# Patient Record
Sex: MALE | Race: WHITE | NOT HISPANIC OR LATINO | Employment: OTHER | URBAN - METROPOLITAN AREA
[De-identification: names, ages, dates, MRNs, and addresses within clinical notes are randomized per-mention and may not be internally consistent; named-entity substitution may affect disease eponyms.]

---

## 2018-05-18 ENCOUNTER — OFFICE VISIT (OUTPATIENT)
Dept: MEDICAL GROUP | Facility: MEDICAL CENTER | Age: 73
End: 2018-05-18
Payer: MEDICARE

## 2018-05-18 VITALS
RESPIRATION RATE: 16 BRPM | SYSTOLIC BLOOD PRESSURE: 130 MMHG | HEART RATE: 85 BPM | BODY MASS INDEX: 32.14 KG/M2 | TEMPERATURE: 98.8 F | OXYGEN SATURATION: 98 % | HEIGHT: 69 IN | WEIGHT: 217 LBS | DIASTOLIC BLOOD PRESSURE: 72 MMHG

## 2018-05-18 DIAGNOSIS — E03.4 HYPOTHYROIDISM DUE TO ACQUIRED ATROPHY OF THYROID: ICD-10-CM

## 2018-05-18 DIAGNOSIS — E78.5 DYSLIPIDEMIA: ICD-10-CM

## 2018-05-18 DIAGNOSIS — I10 ESSENTIAL HYPERTENSION: ICD-10-CM

## 2018-05-18 DIAGNOSIS — Z86.73 H/O TIA (TRANSIENT ISCHEMIC ATTACK) AND STROKE: ICD-10-CM

## 2018-05-18 DIAGNOSIS — Z86.79 H/O HEART FAILURE: ICD-10-CM

## 2018-05-18 DIAGNOSIS — I73.00 RAYNAUD'S PHENOMENON WITHOUT GANGRENE: ICD-10-CM

## 2018-05-18 PROCEDURE — 99204 OFFICE O/P NEW MOD 45 MIN: CPT | Performed by: NURSE PRACTITIONER

## 2018-05-18 RX ORDER — ATORVASTATIN CALCIUM 80 MG/1
80 TABLET, FILM COATED ORAL NIGHTLY
COMMUNITY
End: 2018-05-18

## 2018-05-18 RX ORDER — ATORVASTATIN CALCIUM 40 MG/1
40 TABLET, FILM COATED ORAL DAILY
Qty: 30 TAB | Refills: 11
Start: 2018-05-18 | End: 2018-07-18 | Stop reason: SDUPTHER

## 2018-05-18 RX ORDER — AMLODIPINE BESYLATE AND ATORVASTATIN CALCIUM 2.5; 1 MG/1; MG/1
1 TABLET, FILM COATED ORAL NIGHTLY
COMMUNITY
End: 2018-05-18

## 2018-05-18 RX ORDER — AMLODIPINE BESYLATE 2.5 MG/1
2.5 TABLET ORAL DAILY
Qty: 30 TAB | Refills: 11
Start: 2018-05-18 | End: 2018-07-18 | Stop reason: SDUPTHER

## 2018-05-18 RX ORDER — LEVOTHYROXINE SODIUM 0.05 MG/1
50 TABLET ORAL
COMMUNITY
End: 2018-07-18 | Stop reason: SDUPTHER

## 2018-05-18 RX ORDER — LISINOPRIL 10 MG/1
10 TABLET ORAL DAILY
COMMUNITY
End: 2018-07-18 | Stop reason: SDUPTHER

## 2018-05-18 RX ORDER — LIDOCAINE HYDROCHLORIDE 10 MG/ML
INJECTION, SOLUTION INFILTRATION; PERINEURAL ONCE
COMMUNITY
End: 2018-05-18

## 2018-05-18 RX ORDER — BETAMETHASONE DIPROPIONATE 0.05 %
OINTMENT (GRAM) TOPICAL 2 TIMES DAILY
COMMUNITY
End: 2018-05-18

## 2018-05-18 ASSESSMENT — PATIENT HEALTH QUESTIONNAIRE - PHQ9: CLINICAL INTERPRETATION OF PHQ2 SCORE: 0

## 2018-05-18 NOTE — PROGRESS NOTES
Subjective:      Luke Davis is a 72 y.o. male who presents with Establish Care and Medication Refill        CC: Patient is here today to establish care with history of Reynauds, dyslipidemia and hypothyroidism. He recently moved here from the [place] area.    HPI Luke Davis      1. H/O heart failure  Patient apparently had an episode of heart failure 1995 and he states was related to a period in time where he was having issues with his reynauds and has had no problems since then. He states he does not see a cardiologist and has not had a recent echocardiogram. He is not on a diuretic and reports no edema.    2. H/O TIA (transient ischemic attack) and stroke  This apparently happened in 2011 and he has had no problems since. He is on Lipitor.    3. Dyslipidemia  Patient currently on half of a 80 mg Lipitor tablet and would prefer to go to a full 40 mg so he does not have to cut his tablets in half.    4. Raynaud's phenomenon without gangrene  Patient controls this mostly by keeping his hands warm when it is cold outside as well as taking his low-dose amlodipine.    5. Hypothyroidism due to acquired atrophy of thyroid  Patient's most recent TSH from 2 months ago was at 6.0. He states he takes his levothyroxine 50 daily.    6. Essential hypertension  Blood pressure appears well controlled on lisinopril and Norvasc.  Current Outpatient Prescriptions   Medication Sig Dispense Refill   • levothyroxine (SYNTHROID) 50 MCG Tab Take 50 mcg by mouth Every morning on an empty stomach.     • lisinopril (PRINIVIL) 10 MG Tab Take 10 mg by mouth every day.     • atorvastatin (LIPITOR) 40 MG Tab Take 1 Tab by mouth every day. 30 Tab 11   • amLODIPine (NORVASC) 2.5 MG Tab Take 1 Tab by mouth every day. 30 Tab 11     No current facility-administered medications for this visit.      Social History   Substance Use Topics   • Smoking status: Never Smoker   • Smokeless tobacco: Never Used   • Alcohol use Yes     Family History   Problem  "Relation Age of Onset   • No Known Problems Mother    • No Known Problems Father    History reviewed. No pertinent past medical history.    Review of Systems   All other systems reviewed and are negative.         Objective:     /72   Pulse 85   Temp 37.1 °C (98.8 °F)   Resp 16   Ht 1.753 m (5' 9\")   Wt 98.4 kg (217 lb)   SpO2 98%   BMI 32.05 kg/m²      Physical Exam   Constitutional: He is oriented to person, place, and time. He appears well-developed and well-nourished. No distress.   HENT:   Head: Normocephalic and atraumatic.   Right Ear: External ear normal.   Left Ear: External ear normal.   Nose: Nose normal.   Mouth/Throat: Oropharynx is clear and moist.   Eyes: Conjunctivae are normal. Right eye exhibits no discharge. Left eye exhibits no discharge.   Neck: Normal range of motion. Neck supple. No tracheal deviation present. No thyromegaly present.   Cardiovascular: Normal rate, regular rhythm and normal heart sounds.    No murmur heard.  Pulmonary/Chest: Effort normal and breath sounds normal. No respiratory distress. He has no wheezes. He has no rales.   Lymphadenopathy:     He has no cervical adenopathy.   Neurological: He is alert and oriented to person, place, and time. Coordination normal.   Skin: Skin is warm and dry. No rash noted. He is not diaphoretic. No erythema.   Psychiatric: He has a normal mood and affect. His behavior is normal. Judgment and thought content normal.   Nursing note and vitals reviewed.              Assessment/Plan:     1. H/O heart failure  Patient reports this was a one-time event but it does not appear he has had a recent echocardiogram and would like to check this and refer him to cardiology if necessary. He is not on a diuretic but reports no edema. He is taking a statin and ACE inhibitor.  - ECHOCARDIOGRAM COMP W/O CONT; Future    2. H/O TIA (transient ischemic attack) and stroke  Patient reports no episodes since 2011.    3. Dyslipidemia  I will switch " patient to the full tablet of the Lipitor 40 mg so he does not have to cut tablets in half.  - atorvastatin (LIPITOR) 40 MG Tab; Take 1 Tab by mouth every day.  Dispense: 30 Tab; Refill: 11    4. Raynaud's phenomenon without gangrene  Patient states his symptoms are controlled with his low-dose amlodipine and keeping his hands away from cold objects.  - amLODIPine (NORVASC) 2.5 MG Tab; Take 1 Tab by mouth every day.  Dispense: 30 Tab; Refill: 11    5. Hypothyroidism due to acquired atrophy of thyroid  TSH recently done and within acceptable range for his age.    6. Essential hypertension  Blood pressure currently well controlled.

## 2018-05-18 NOTE — LETTER
[facility]  ZORA ValdovinosPYEMI  [address]  [place] [address]  Fax: [phone]   Authorization for Release/Disclosure of   Protected Health Information   Name: LUKE DAVIS : 1945 SSN: [id number]   Address: [address] [place] [address] [address] [place] [address] Phone:    [phone] (home)    I authorize the entity listed below to release/disclose the PHI below to:   [facility]/MARY Valdovinos and MARY Valdovinos   Provider or Entity Name:     Address   City, State, Zip   Phone:      Fax:     Reason for request: continuity of care   Information to be released:    [  ] LAST COLONOSCOPY,  including any PATH REPORT and follow-up  [  ] LAST FIT/COLOGUARD RESULT [  ] LAST DEXA  [  ] LAST MAMMOGRAM  [  ] LAST PAP  [  ] LAST LABS [  ] RETINA EXAM REPORT  [  ] IMMUNIZATION RECORDS  [  ] Release all info      [  ] Check here and initial the line next to each item to release ALL health information INCLUDING  _____ Care and treatment for drug and / or alcohol abuse  _____ HIV testing, infection status, or AIDS  _____ Genetic Testing    DATES OF SERVICE OR TIME PERIOD TO BE DISCLOSED: _____________  I understand and acknowledge that:  * This Authorization may be revoked at any time by you in writing, except if your health information has already been used or disclosed.  * Your health information that will be used or disclosed as a result of you signing this authorization could be re-disclosed by the recipient. If this occurs, your re-disclosed health information may no longer be protected by State or Federal laws.  * You may refuse to sign this Authorization. Your refusal will not affect your ability to obtain treatment.  * This Authorization becomes effective upon signing and will  on (date) __________.      If no date is indicated, this Authorization will  one (1) year from the signature date.    Name: Luke Davis    Signature:   Date:     2018       PLEASE FAX REQUESTED  RECORDS BACK TO: [phone]

## 2018-07-09 ENCOUNTER — APPOINTMENT (OUTPATIENT)
Dept: MEDICAL GROUP | Facility: MEDICAL CENTER | Age: 73
End: 2018-07-09
Payer: MEDICARE

## 2018-07-09 ENCOUNTER — HOSPITAL ENCOUNTER (OUTPATIENT)
Dept: CARDIOLOGY | Facility: MEDICAL CENTER | Age: 73
End: 2018-07-09
Attending: NURSE PRACTITIONER
Payer: MEDICARE

## 2018-07-09 DIAGNOSIS — Z86.79 H/O HEART FAILURE: ICD-10-CM

## 2018-07-09 DIAGNOSIS — Z23 NEED FOR TDAP VACCINATION: ICD-10-CM

## 2018-07-09 LAB
LV EJECT FRACT  99904: 50
LV EJECT FRACT MOD 2C 99903: 51.65
LV EJECT FRACT MOD 4C 99902: 51.99
LV EJECT FRACT MOD BP 99901: 54.02

## 2018-07-09 PROCEDURE — 93306 TTE W/DOPPLER COMPLETE: CPT | Mod: 26 | Performed by: INTERNAL MEDICINE

## 2018-07-09 PROCEDURE — 93306 TTE W/DOPPLER COMPLETE: CPT

## 2018-07-12 ENCOUNTER — APPOINTMENT (OUTPATIENT)
Dept: MEDICAL GROUP | Facility: MEDICAL CENTER | Age: 73
End: 2018-07-12
Payer: MEDICARE

## 2018-07-18 ENCOUNTER — OFFICE VISIT (OUTPATIENT)
Dept: MEDICAL GROUP | Facility: MEDICAL CENTER | Age: 73
End: 2018-07-18
Payer: MEDICARE

## 2018-07-18 VITALS
SYSTOLIC BLOOD PRESSURE: 130 MMHG | OXYGEN SATURATION: 97 % | RESPIRATION RATE: 16 BRPM | DIASTOLIC BLOOD PRESSURE: 68 MMHG | HEIGHT: 69 IN | HEART RATE: 82 BPM | WEIGHT: 212 LBS | BODY MASS INDEX: 31.4 KG/M2 | TEMPERATURE: 97.7 F

## 2018-07-18 DIAGNOSIS — I73.00 RAYNAUD'S PHENOMENON WITHOUT GANGRENE: ICD-10-CM

## 2018-07-18 DIAGNOSIS — R73.01 IMPAIRED FASTING GLUCOSE: ICD-10-CM

## 2018-07-18 DIAGNOSIS — Z86.79 H/O HEART FAILURE: ICD-10-CM

## 2018-07-18 DIAGNOSIS — E78.5 DYSLIPIDEMIA: ICD-10-CM

## 2018-07-18 DIAGNOSIS — Z86.73 H/O TIA (TRANSIENT ISCHEMIC ATTACK) AND STROKE: ICD-10-CM

## 2018-07-18 PROCEDURE — 99214 OFFICE O/P EST MOD 30 MIN: CPT | Performed by: NURSE PRACTITIONER

## 2018-07-18 RX ORDER — LISINOPRIL 10 MG/1
10 TABLET ORAL DAILY
Qty: 90 TAB | Refills: 2 | Status: SHIPPED | OUTPATIENT
Start: 2018-07-18 | End: 2019-04-17 | Stop reason: SDUPTHER

## 2018-07-18 RX ORDER — ASPIRIN 81 MG/1
81 TABLET, CHEWABLE ORAL DAILY
Qty: 100 TAB | COMMUNITY
Start: 2018-07-18

## 2018-07-18 RX ORDER — ATORVASTATIN CALCIUM 40 MG/1
40 TABLET, FILM COATED ORAL DAILY
Qty: 90 TAB | Refills: 2 | Status: SHIPPED | OUTPATIENT
Start: 2018-07-18 | End: 2018-07-18

## 2018-07-18 RX ORDER — ATORVASTATIN CALCIUM 80 MG/1
80 TABLET, FILM COATED ORAL EVERY EVENING
Qty: 90 TAB | Refills: 3 | Status: SHIPPED | OUTPATIENT
Start: 2018-07-18 | End: 2019-04-17 | Stop reason: SDUPTHER

## 2018-07-18 RX ORDER — LEVOTHYROXINE SODIUM 0.05 MG/1
50 TABLET ORAL
Qty: 90 TAB | Refills: 2 | Status: SHIPPED | OUTPATIENT
Start: 2018-07-18 | End: 2019-04-17 | Stop reason: SDUPTHER

## 2018-07-18 RX ORDER — AMLODIPINE BESYLATE 2.5 MG/1
2.5 TABLET ORAL DAILY
Qty: 90 TAB | Refills: 2 | Status: SHIPPED | OUTPATIENT
Start: 2018-07-18 | End: 2019-04-15

## 2018-07-18 NOTE — PROGRESS NOTES
Subjective:      Luke Davis is a 72 y.o. male who presents with Other (right tumb issues )        CC: Patient is here today for follow-up on echocardiogram as well as nail concerns.    HPI Luke Davis    1. H/O heart failure  Patient was seen for his first visit in May of this year after moving from the [place] area. He reports history of heart failure in 1995 and a period of time when he was having issues with his Raynaud's but does not feel he has had knee problems since then. I had him go for an echocardiogram since he reported he had not had one recently and the results showed low normal ejection fraction of 50% but otherwise normal. He denies lower extremity edema, shortness of breath, easy fatigability or inability to lay flat at night.    2. H/O TIA (transient ischemic attack) and stroke  Patient has history of TIA from 2011 and does take a baby aspirin daily and is on antihypertensive medicine and statin.    3. Dyslipidemia  Patient currently on Lipitor 40 mg a day and reports no side effects. He has many risk factors including history of TIA and stroke and heart failure. His most recent lab work from his March 2018 notes show LDL at 122 despite medication.    4. Raynaud's phenomenon without gangrene  Patient continues to use warm gloves when it is cold outside. He noticed a indentation in his right large thumbnail which initially caused some discomfort but now is growing out and causing no pain. He has not seen redness, swelling or discharge. It has not affected any other nail.    5. Impaired fasting glucose  Previous lab work from [place] showed hemoglobin A1c of 5.9. He is not on any medication for diabetes.      Echocardiography Laboratory    CONCLUSIONS  Left ventricular systolic function is low normal.  No significant valve abnormalities.   No prior study is available for comparison.   Social History   Substance Use Topics   • Smoking status: Never Smoker   • Smokeless tobacco: Never Used  "  • Alcohol use Yes     Current Outpatient Prescriptions   Medication Sig Dispense Refill   • aspirin (ASA) 81 MG Chew Tab chewable tablet Take 1 Tab by mouth every day. 100 Tab    • levothyroxine (SYNTHROID) 50 MCG Tab Take 1 Tab by mouth Every morning on an empty stomach. 90 Tab 2   • lisinopril (PRINIVIL) 10 MG Tab Take 1 Tab by mouth every day. 90 Tab 2   • amLODIPine (NORVASC) 2.5 MG Tab Take 1 Tab by mouth every day. 90 Tab 2   • atorvastatin (LIPITOR) 80 MG tablet Take 1 Tab by mouth every evening. 90 Tab 3     No current facility-administered medications for this visit.      Family History   Problem Relation Age of Onset   • No Known Problems Mother    • No Known Problems Father    History reviewed. No pertinent past medical history.    Review of Systems   All other systems reviewed and are negative.         Objective:     /68   Pulse 82   Temp 36.5 °C (97.7 °F)   Resp 16   Ht 1.753 m (5' 9\")   Wt 96.2 kg (212 lb)   SpO2 97%   BMI 31.31 kg/m²      Physical Exam   Constitutional: He is oriented to person, place, and time. He appears well-developed and well-nourished. No distress.   HENT:   Head: Normocephalic and atraumatic.   Right Ear: External ear normal.   Left Ear: External ear normal.   Nose: Nose normal.   Mouth/Throat: Oropharynx is clear and moist.   Eyes: Conjunctivae are normal. Right eye exhibits no discharge. Left eye exhibits no discharge.   Neck: Normal range of motion. Neck supple. No tracheal deviation present. No thyromegaly present.   Cardiovascular: Normal rate, regular rhythm and normal heart sounds.    No murmur heard.  Pulmonary/Chest: Effort normal and breath sounds normal. No respiratory distress. He has no wheezes. He has no rales.   Lymphadenopathy:     He has no cervical adenopathy.   Neurological: He is alert and oriented to person, place, and time. Coordination normal.   Skin: Skin is warm and dry. No rash noted. He is not diaphoretic. No erythema.   Right large " thumbnail slightly indented but no color changes, swelling, redness or tenderness.   Psychiatric: He has a normal mood and affect. His behavior is normal. Judgment and thought content normal.   Nursing note and vitals reviewed.              Assessment/Plan:     1. H/O heart failure  Patient has history of heart failure although he has no current heart failure symptoms. He states he has not seen a cardiologist in a while and has no cardiologist established locally. His echocardiogram showed low normal ejection fraction at 50% but valves were normal. I will get him into our local cardiology to have one established in the area.  - REFERRAL TO CARDIOLOGY  - aspirin (ASA) 81 MG Chew Tab chewable tablet; Take 1 Tab by mouth every day.  Dispense: 100 Tab  - atorvastatin (LIPITOR) 80 MG tablet; Take 1 Tab by mouth every evening.  Dispense: 90 Tab; Refill: 3    2. H/O TIA (transient ischemic attack) and stroke  Patient will continue with his baby aspirin and antihypertensive agents and I will increase his Lipitor dosage.  - REFERRAL TO CARDIOLOGY  - aspirin (ASA) 81 MG Chew Tab chewable tablet; Take 1 Tab by mouth every day.  Dispense: 100 Tab    3. Dyslipidemia  It appears the patient's last LDL was 122 and with his risk factors I would like to see this closer to 70 or less and therefore will increase his Lipitor to 80 mg. I told him to report to me any myalgias or other symptoms with the higher dosage immediately.  - atorvastatin (LIPITOR) 80 MG tablet; Take 1 Tab by mouth every evening.  Dispense: 90 Tab; Refill: 3    4. Raynaud's phenomenon without gangrene    - amLODIPine (NORVASC) 2.5 MG Tab; Take 1 Tab by mouth every day.  Dispense: 90 Tab; Refill: 2    5. Impaired fasting glucose  Patient reminded to watch his carbohydrates and lose weight to prevent becoming a type II diabetic.

## 2018-08-02 ENCOUNTER — OFFICE VISIT (OUTPATIENT)
Dept: CARDIOLOGY | Facility: MEDICAL CENTER | Age: 73
End: 2018-08-02
Payer: MEDICARE

## 2018-08-02 VITALS
DIASTOLIC BLOOD PRESSURE: 60 MMHG | SYSTOLIC BLOOD PRESSURE: 116 MMHG | WEIGHT: 210 LBS | HEART RATE: 94 BPM | HEIGHT: 69 IN | BODY MASS INDEX: 31.1 KG/M2 | OXYGEN SATURATION: 94 %

## 2018-08-02 DIAGNOSIS — Z79.899 HIGH RISK MEDICATION USE: ICD-10-CM

## 2018-08-02 DIAGNOSIS — E78.5 DYSLIPIDEMIA: ICD-10-CM

## 2018-08-02 DIAGNOSIS — R06.09 DYSPNEA ON EXERTION: ICD-10-CM

## 2018-08-02 DIAGNOSIS — I10 HTN (HYPERTENSION), MALIGNANT: ICD-10-CM

## 2018-08-02 PROCEDURE — 94618 PULMONARY STRESS TESTING: CPT | Performed by: INTERNAL MEDICINE

## 2018-08-02 PROCEDURE — 99204 OFFICE O/P NEW MOD 45 MIN: CPT | Mod: 25 | Performed by: INTERNAL MEDICINE

## 2018-08-02 ASSESSMENT — MINNESOTA LIVING WITH HEART FAILURE QUESTIONNAIRE (MLHF)
DIFFICULTY WITH SEXUAL ACTIVITIES: 0
DIFFICULTY WORKING TO EARN A LIVING: 0
LOSS OF SELF CONTROL IN YOUR LIFE: 0
MAKING YOU WORRY: 0
DIFFICULTY WITH RECREATIONAL PASTIMES, SPORTS, HOBBIES: 0
HAVING TO SIT OR LIE DOWN DURING THE DAY: 0
EATING LESS FOODS YOU LIKE: 0
COSTING YOU MONEY FOR MEDICAL CARE: 0
MAKING YOU SHORT OF BREATH: 0
DIFFICULTY TO CONCENTRATE OR REMEMBERING THINGS: 0
TOTAL_SCORE: 0
TIRED, FATIGUED OR LOW ON ENERGY: 0
SWELLING IN ANKLES OR LEGS: 0
DIFFICULTY GOING AWAY FROM HOME: 0
WORKING AROUND THE HOUSE OR YARD DIFFICULT: 0
DIFFICULTY SOCIALIZING WITH FAMILY OR FRIENDS: 0
MAKING YOU STAY IN A HOSPITAL: 0
FEELING LIKE A BURDEN TO FAMILY AND FRIENDS: 0
WALKING ABOUT OR CLIMBING STAIRS DIFFICULT: 0
MAKING YOU FEEL DEPRESSED: 0
DIFFICULTY SLEEPING WELL AT NIGHT: 0
GIVING YOU SIDE EFFECTS FROM TREATMENTS: 0

## 2018-08-02 ASSESSMENT — ENCOUNTER SYMPTOMS
HALLUCINATIONS: 0
ABDOMINAL PAIN: 0
BRUISES/BLEEDS EASILY: 0
BLURRED VISION: 0
COUGH: 0
SENSORY CHANGE: 0
FEVER: 0
PALPITATIONS: 0
DOUBLE VISION: 0
CHILLS: 0
NAUSEA: 0
DIZZINESS: 0
BLOOD IN STOOL: 0
WEIGHT LOSS: 0
HEADACHES: 0
ORTHOPNEA: 0
FALLS: 0
DEPRESSION: 0
MYALGIAS: 0
SHORTNESS OF BREATH: 0
PND: 0
CLAUDICATION: 0
SPEECH CHANGE: 0
VOMITING: 0
EYE DISCHARGE: 0
LOSS OF CONSCIOUSNESS: 0
EYE PAIN: 0

## 2018-08-02 NOTE — PROGRESS NOTES
Chief Complaint   Patient presents with   • CHF (Chronic)   • HTN (Controlled)       Subjective:   Luke Davis is a 72 y.o. male who presents today for further evaluation of prior history of heart failure. Per patient's report, the diagnosis was never confirmed. At that time, he had severe vasculitis problem with Raynaud's phenomenon. He was was on a lot of medications. His heart function might had been ineffective negatively.    Since his vasculitis was successfully treated, patient has been feeling much better. No shortness of breath, no chest pain.    Patient was able to complete 415 m during his 6 minute walk test. his O2 saturation at baseline was 90% and at the end of the test, the O2 saturation was 94%. he reported 2 level of dyspnea on Janel scale.    I have independently reviewed echocardiogram's actual images with patient in clinic which shows normal left ventricular systolic function. No wall motion abnormality. No evidence of pulmonary hypertension. No significant valvular disease.    No family history of sudden cardiac death.  History reviewed. No pertinent past medical history.  History reviewed. No pertinent surgical history.  Family History   Problem Relation Age of Onset   • No Known Problems Mother    • No Known Problems Father      Social History     Social History   • Marital status: Single     Spouse name: N/A   • Number of children: N/A   • Years of education: N/A     Occupational History   • Not on file.     Social History Main Topics   • Smoking status: Never Smoker   • Smokeless tobacco: Never Used   • Alcohol use Yes   • Drug use: No   • Sexual activity: Not Currently     Other Topics Concern   • Not on file     Social History Narrative   • No narrative on file     No Known Allergies  Outpatient Encounter Prescriptions as of 8/2/2018   Medication Sig Dispense Refill   • aspirin (ASA) 81 MG Chew Tab chewable tablet Take 1 Tab by mouth every day. 100 Tab    • levothyroxine (SYNTHROID)  "50 MCG Tab Take 1 Tab by mouth Every morning on an empty stomach. 90 Tab 2   • lisinopril (PRINIVIL) 10 MG Tab Take 1 Tab by mouth every day. 90 Tab 2   • amLODIPine (NORVASC) 2.5 MG Tab Take 1 Tab by mouth every day. 90 Tab 2   • atorvastatin (LIPITOR) 80 MG tablet Take 1 Tab by mouth every evening. 90 Tab 3     No facility-administered encounter medications on file as of 8/2/2018.      Review of Systems   Constitutional: Negative for chills, fever, malaise/fatigue and weight loss.   HENT: Negative for ear discharge, ear pain, hearing loss and nosebleeds.    Eyes: Negative for blurred vision, double vision, pain and discharge.   Respiratory: Negative for cough and shortness of breath.    Cardiovascular: Negative for chest pain, palpitations, orthopnea, claudication, leg swelling and PND.   Gastrointestinal: Negative for abdominal pain, blood in stool, melena, nausea and vomiting.   Genitourinary: Negative for dysuria and hematuria.   Musculoskeletal: Negative for falls, joint pain and myalgias.   Skin: Negative for itching and rash.   Neurological: Negative for dizziness, sensory change, speech change, loss of consciousness and headaches.   Endo/Heme/Allergies: Negative for environmental allergies. Does not bruise/bleed easily.   Psychiatric/Behavioral: Negative for depression, hallucinations and suicidal ideas.        Objective:   /60   Pulse 94   Ht 1.753 m (5' 9\")   Wt 95.3 kg (210 lb)   SpO2 94%   BMI 31.01 kg/m²     Physical Exam   Constitutional: He is oriented to person, place, and time. No distress.   HENT:   Head: Normocephalic and atraumatic.   Right Ear: External ear normal.   Left Ear: External ear normal.   Eyes: Right eye exhibits no discharge. Left eye exhibits no discharge.   Neck: No JVD present. No thyromegaly present.   Cardiovascular: Normal rate, regular rhythm, normal heart sounds and intact distal pulses.  Exam reveals no gallop and no friction rub.    No murmur " heard.  Pulmonary/Chest: Breath sounds normal. No respiratory distress.   Abdominal: Bowel sounds are normal. He exhibits no distension. There is no tenderness.   Musculoskeletal: He exhibits no edema or tenderness.   Neurological: He is alert and oriented to person, place, and time. No cranial nerve deficit.   Skin: Skin is warm and dry. He is not diaphoretic.   Psychiatric: He has a normal mood and affect. His behavior is normal.   Nursing note and vitals reviewed.      Assessment:     1. HTN (hypertension), malignant  [place] PULMONARY STRESS TESTING 6 MIN WALK    COMP METABOLIC PANEL    LIPID PANEL   2. Dyslipidemia  [place] PULMONARY STRESS TESTING 6 MIN WALK    COMP METABOLIC PANEL    LIPID PANEL   3. Dyspnea on exertion  [place] PULMONARY STRESS TESTING 6 MIN WALK    COMP METABOLIC PANEL    LIPID PANEL   4. High risk medication use  [place] PULMONARY STRESS TESTING 6 MIN WALK    COMP METABOLIC PANEL    LIPID PANEL       Medical Decision Making:  Today's Assessment / Status / Plan:   At this time, I am reluctant to give patient a diagnosis of heart failure.  His blood pressure very well controlled.  Cont current medications at current dose.     I will see patient back in clinic with lab tests and studies results in 12 months.    I thank you for referring patient to our Cardiology Clinic today.

## 2018-08-02 NOTE — LETTER
[facility]   [address] [place] [address]  [place] [address]  Phone: [phone]  Fax: [phone]              Luke Davis  1945    Encounter Date: 8/2/2018    Beka Arora M.D.          PROGRESS NOTE:  Chief Complaint   Patient presents with   • CHF (Chronic)   • HTN (Controlled)       Subjective:   Luke Davis is a 72 y.o. male who presents today for further evaluation of prior history of heart failure. Per patient's report, the diagnosis was never confirmed. At that time, he had severe vasculitis problem with Raynaud's phenomenon. He was was on a lot of medications. His heart function might had been ineffective negatively.    Since his vasculitis was successfully treated, patient has been feeling much better. No shortness of breath, no chest pain.    Patient was able to complete 415 m during his 6 minute walk test. his O2 saturation at baseline was 90% and at the end of the test, the O2 saturation was 94%. he reported 2 level of dyspnea on Janel scale.    I have independently reviewed echocardiogram's actual images with patient in clinic which shows normal left ventricular systolic function. No wall motion abnormality. No evidence of pulmonary hypertension. No significant valvular disease.    No family history of sudden cardiac death.  History reviewed. No pertinent past medical history.  History reviewed. No pertinent surgical history.  Family History   Problem Relation Age of Onset   • No Known Problems Mother    • No Known Problems Father      Social History     Social History   • Marital status: Single     Spouse name: N/A   • Number of children: N/A   • Years of education: N/A     Occupational History   • Not on file.     Social History Main Topics   • Smoking status: Never Smoker   • Smokeless tobacco: Never Used   • Alcohol use Yes   • Drug use: No   • Sexual activity: Not Currently     Other Topics Concern   • Not on file     Social  "History Narrative   • No narrative on file     No Known Allergies  Outpatient Encounter Prescriptions as of 8/2/2018   Medication Sig Dispense Refill   • aspirin (ASA) 81 MG Chew Tab chewable tablet Take 1 Tab by mouth every day. 100 Tab    • levothyroxine (SYNTHROID) 50 MCG Tab Take 1 Tab by mouth Every morning on an empty stomach. 90 Tab 2   • lisinopril (PRINIVIL) 10 MG Tab Take 1 Tab by mouth every day. 90 Tab 2   • amLODIPine (NORVASC) 2.5 MG Tab Take 1 Tab by mouth every day. 90 Tab 2   • atorvastatin (LIPITOR) 80 MG tablet Take 1 Tab by mouth every evening. 90 Tab 3     No facility-administered encounter medications on file as of 8/2/2018.      Review of Systems   Constitutional: Negative for chills, fever, malaise/fatigue and weight loss.   HENT: Negative for ear discharge, ear pain, hearing loss and nosebleeds.    Eyes: Negative for blurred vision, double vision, pain and discharge.   Respiratory: Negative for cough and shortness of breath.    Cardiovascular: Negative for chest pain, palpitations, orthopnea, claudication, leg swelling and PND.   Gastrointestinal: Negative for abdominal pain, blood in stool, melena, nausea and vomiting.   Genitourinary: Negative for dysuria and hematuria.   Musculoskeletal: Negative for falls, joint pain and myalgias.   Skin: Negative for itching and rash.   Neurological: Negative for dizziness, sensory change, speech change, loss of consciousness and headaches.   Endo/Heme/Allergies: Negative for environmental allergies. Does not bruise/bleed easily.   Psychiatric/Behavioral: Negative for depression, hallucinations and suicidal ideas.        Objective:   /60   Pulse 94   Ht 1.753 m (5' 9\")   Wt 95.3 kg (210 lb)   SpO2 94%   BMI 31.01 kg/m²      Physical Exam   Constitutional: He is oriented to person, place, and time. No distress.   HENT:   Head: Normocephalic and atraumatic.   Right Ear: External ear normal.   Left Ear: External ear normal.   Eyes: Right eye " exhibits no discharge. Left eye exhibits no discharge.   Neck: No JVD present. No thyromegaly present.   Cardiovascular: Normal rate, regular rhythm, normal heart sounds and intact distal pulses.  Exam reveals no gallop and no friction rub.    No murmur heard.  Pulmonary/Chest: Breath sounds normal. No respiratory distress.   Abdominal: Bowel sounds are normal. He exhibits no distension. There is no tenderness.   Musculoskeletal: He exhibits no edema or tenderness.   Neurological: He is alert and oriented to person, place, and time. No cranial nerve deficit.   Skin: Skin is warm and dry. He is not diaphoretic.   Psychiatric: He has a normal mood and affect. His behavior is normal.   Nursing note and vitals reviewed.      Assessment:     1. HTN (hypertension), malignant  [place] PULMONARY STRESS TESTING 6 MIN WALK    COMP METABOLIC PANEL    LIPID PANEL   2. Dyslipidemia  [place] PULMONARY STRESS TESTING 6 MIN WALK    COMP METABOLIC PANEL    LIPID PANEL   3. Dyspnea on exertion  [place] PULMONARY STRESS TESTING 6 MIN WALK    COMP METABOLIC PANEL    LIPID PANEL   4. High risk medication use  [place] PULMONARY STRESS TESTING 6 MIN WALK    COMP METABOLIC PANEL    LIPID PANEL       Medical Decision Making:  Today's Assessment / Status / Plan:   At this time, I am reluctant to give patient a diagnosis of heart failure.  His blood pressure very well controlled.  Cont current medications at current dose.     I will see patient back in clinic with lab tests and studies results in 12 months.    I thank you for referring patient to our Cardiology Clinic today.        ELVI ValdovinosN.  [address] [address]  [place] [address]  VIA In Basket

## 2019-04-15 ENCOUNTER — TELEPHONE (OUTPATIENT)
Dept: MEDICAL GROUP | Facility: MEDICAL CENTER | Age: 74
End: 2019-04-15

## 2019-04-15 ENCOUNTER — OFFICE VISIT (OUTPATIENT)
Dept: MEDICAL GROUP | Facility: MEDICAL CENTER | Age: 74
End: 2019-04-15
Payer: MEDICARE

## 2019-04-15 VITALS
HEIGHT: 69 IN | DIASTOLIC BLOOD PRESSURE: 80 MMHG | OXYGEN SATURATION: 95 % | HEART RATE: 81 BPM | BODY MASS INDEX: 33.03 KG/M2 | SYSTOLIC BLOOD PRESSURE: 140 MMHG | WEIGHT: 223 LBS | RESPIRATION RATE: 16 BRPM

## 2019-04-15 DIAGNOSIS — R21 RASH: ICD-10-CM

## 2019-04-15 DIAGNOSIS — I10 ESSENTIAL HYPERTENSION: ICD-10-CM

## 2019-04-15 DIAGNOSIS — Z86.79 H/O HEART FAILURE: ICD-10-CM

## 2019-04-15 DIAGNOSIS — E03.4 HYPOTHYROIDISM DUE TO ACQUIRED ATROPHY OF THYROID: ICD-10-CM

## 2019-04-15 DIAGNOSIS — R06.02 SOB (SHORTNESS OF BREATH): ICD-10-CM

## 2019-04-15 DIAGNOSIS — R73.01 IMPAIRED FASTING GLUCOSE: ICD-10-CM

## 2019-04-15 DIAGNOSIS — E78.5 DYSLIPIDEMIA: ICD-10-CM

## 2019-04-15 PROCEDURE — 99214 OFFICE O/P EST MOD 30 MIN: CPT | Performed by: NURSE PRACTITIONER

## 2019-04-15 RX ORDER — AMLODIPINE BESYLATE 5 MG/1
5 TABLET ORAL DAILY
Qty: 90 TAB | Refills: 3 | Status: SHIPPED | OUTPATIENT
Start: 2019-04-15 | End: 2020-04-04

## 2019-04-15 ASSESSMENT — ACTIVITIES OF DAILY LIVING (ADL): BATHING_REQUIRES_ASSISTANCE: 0

## 2019-04-15 ASSESSMENT — ENCOUNTER SYMPTOMS
GENERAL WELL-BEING: GOOD
SHORTNESS OF BREATH: 1

## 2019-04-15 ASSESSMENT — PATIENT HEALTH QUESTIONNAIRE - PHQ9: CLINICAL INTERPRETATION OF PHQ2 SCORE: 0

## 2019-04-15 NOTE — TELEPHONE ENCOUNTER
1. Caller Name: name                                   Call Back Number: [phone]       Pt needs a referral for Dermatology         Patient approves a detailed voicemail message: N\A

## 2019-04-15 NOTE — PROGRESS NOTES
Subjective:      Luke Davis is a 73 y.o. male who presents with Medication Refill; Medication Management (discuss med side effect/ atorvastatin); Orders Needed (sonogram); and Referral Needed (dermatologist )        CC: Patient is here today for follow-up on hypertension, hypothyroidism, dyslipidemia and shortness of breath.    HPI Luke Davis      1. Essential hypertension  Patient currently on low-dose lisinopril 10 mg and amlodipine 2.5 mg, mostly for his Reynauds.  He brings with him blood pressure readings with many of the readings above 140/90 including his reading in the office today.    2. SOB (shortness of breath)  Patient states that he has noticed over the past 2 months that his breathing feels shorter than usual.  It is usually when he tries to exert himself.  He states there is no shortness of breath at rest or when supine.  He denies any associated chest pain, dizziness or lower extremity edema.  He was last seen by cardiology in August of last year.    3. Hypothyroidism due to acquired atrophy of thyroid  Patient currently on levothyroxine 50 mcg but unfortunately has not done his lab work since moving to the area.    4. Dyslipidemia  Patient currently on high-dose Lipitor and reports no myalgias.    5. Impaired fasting glucose  History of prediabetes but lab work was not completed.    6. H/O heart failure  There was some question of heart failure in the past patient states it was due to being on steroids and the diagnosis was never fully confirmed it was felt that his cardiology visit in August that it would be difficult to say he actually had heart failure.  His last echocardiogram showed an ejection fraction of 50% but no evidence of heart failure.  Current Outpatient Prescriptions   Medication Sig Dispense Refill   • amLODIPine (NORVASC) 5 MG Tab Take 1 Tab by mouth every day. 90 Tab 3   • aspirin (ASA) 81 MG Chew Tab chewable tablet Take 1 Tab by mouth every day. 100 Tab    •  "levothyroxine (SYNTHROID) 50 MCG Tab Take 1 Tab by mouth Every morning on an empty stomach. 90 Tab 2   • lisinopril (PRINIVIL) 10 MG Tab Take 1 Tab by mouth every day. 90 Tab 2   • atorvastatin (LIPITOR) 80 MG tablet Take 1 Tab by mouth every evening. 90 Tab 3     No current facility-administered medications for this visit.      Social History   Substance Use Topics   • Smoking status: Never Smoker   • Smokeless tobacco: Never Used   • Alcohol use Yes     Family History   Problem Relation Age of Onset   • No Known Problems Mother    • No Known Problems Father    History reviewed. No pertinent past medical history.    Review of Systems   Respiratory: Positive for shortness of breath.    All other systems reviewed and are negative.         Objective:     /80 (BP Location: Right arm, Patient Position: Sitting, BP Cuff Size: Adult)   Pulse 81   Resp 16   Ht 1.753 m (5' 9\")   Wt 101.2 kg (223 lb)   SpO2 95%   BMI 32.93 kg/m²      Physical Exam   Constitutional: He is oriented to person, place, and time. He appears well-developed and well-nourished. No distress.   HENT:   Head: Normocephalic and atraumatic.   Right Ear: External ear normal.   Left Ear: External ear normal.   Nose: Nose normal.   Mouth/Throat: Oropharynx is clear and moist.   Eyes: Conjunctivae are normal. Right eye exhibits no discharge. Left eye exhibits no discharge.   Neck: Normal range of motion. Neck supple. No tracheal deviation present. No thyromegaly present.   Cardiovascular: Normal rate, regular rhythm and normal heart sounds.    No murmur heard.  Pulmonary/Chest: Effort normal and breath sounds normal. No respiratory distress. He has no wheezes. He has no rales.   Lymphadenopathy:     He has no cervical adenopathy.   Neurological: He is alert and oriented to person, place, and time. Coordination normal.   Skin: Skin is warm and dry. No rash noted. He is not diaphoretic. No erythema.   Psychiatric: He has a normal mood and affect. " His behavior is normal. Judgment and thought content normal.   Nursing note and vitals reviewed.              Assessment/Plan:     1. Essential hypertension  Blood pressure slightly higher than I would like it so I will increase his amlodipine to 5 mg and have him stay on his ACE inhibitor.  - amLODIPine (NORVASC) 5 MG Tab; Take 1 Tab by mouth every day.  Dispense: 90 Tab; Refill: 3  - Comp Metabolic Panel; Future  - CBC WITH DIFFERENTIAL; Future    2. SOB (shortness of breath)  I would like to check to see if his ejection fraction has dropped down below 50% in which case I will send him to cardiology but even if it is normal, I reminded him he is due for follow-up in the next 2 months.  I spoke with him about the need for weight loss and a low sodium diet.  He was advised to go to the ER should he develop increasing shortness of breath or swelling.  - EC-ECHOCARDIOGRAM COMPLETE W/O CONT; Future  - Comp Metabolic Panel; Future  - CBC WITH DIFFERENTIAL; Future    3. Hypothyroidism due to acquired atrophy of thyroid  Patient reminded he should do lab work at least yearly.  - TSH; Future    4. Dyslipidemia  Patient will stay on his high-dose statin.  - Lipid Profile; Future    5. Impaired fasting glucose  Patient needs to be checked for type 2 diabetes.  - HEMOGLOBIN A1C; Future    6. H/O heart failure  Questionable history of heart failure and he had been on a lot of medications including steroids at the time this occurred in [place].

## 2019-04-17 DIAGNOSIS — Z86.79 H/O HEART FAILURE: ICD-10-CM

## 2019-04-17 DIAGNOSIS — I10 ESSENTIAL HYPERTENSION: ICD-10-CM

## 2019-04-17 DIAGNOSIS — E78.5 DYSLIPIDEMIA: ICD-10-CM

## 2019-04-17 RX ORDER — ATORVASTATIN CALCIUM 80 MG/1
80 TABLET, FILM COATED ORAL EVERY EVENING
Qty: 90 TAB | Refills: 3 | Status: SHIPPED | OUTPATIENT
Start: 2019-04-17 | End: 2020-04-04

## 2019-04-17 RX ORDER — LEVOTHYROXINE SODIUM 0.05 MG/1
50 TABLET ORAL
Qty: 90 TAB | Refills: 2 | Status: SHIPPED | OUTPATIENT
Start: 2019-04-17 | End: 2019-06-11

## 2019-04-17 RX ORDER — LISINOPRIL 10 MG/1
10 TABLET ORAL DAILY
Qty: 90 TAB | Refills: 2 | Status: SHIPPED | OUTPATIENT
Start: 2019-04-17 | End: 2020-01-08

## 2019-04-26 ENCOUNTER — HOSPITAL ENCOUNTER (OUTPATIENT)
Dept: LAB | Facility: MEDICAL CENTER | Age: 74
End: 2019-04-26
Attending: NURSE PRACTITIONER
Payer: MEDICARE

## 2019-04-26 DIAGNOSIS — R06.02 SOB (SHORTNESS OF BREATH): ICD-10-CM

## 2019-04-26 DIAGNOSIS — E03.4 HYPOTHYROIDISM DUE TO ACQUIRED ATROPHY OF THYROID: ICD-10-CM

## 2019-04-26 DIAGNOSIS — I10 ESSENTIAL HYPERTENSION: ICD-10-CM

## 2019-04-26 DIAGNOSIS — E78.5 DYSLIPIDEMIA: ICD-10-CM

## 2019-04-26 DIAGNOSIS — R73.01 IMPAIRED FASTING GLUCOSE: ICD-10-CM

## 2019-04-26 LAB
ALBUMIN SERPL BCP-MCNC: 4.3 G/DL (ref 3.2–4.9)
ALBUMIN/GLOB SERPL: 1.3 G/DL
ALP SERPL-CCNC: 67 U/L (ref 30–99)
ALT SERPL-CCNC: 25 U/L (ref 2–50)
ANION GAP SERPL CALC-SCNC: 8 MMOL/L (ref 0–11.9)
AST SERPL-CCNC: 28 U/L (ref 12–45)
BASOPHILS # BLD AUTO: 0.7 % (ref 0–1.8)
BASOPHILS # BLD: 0.05 K/UL (ref 0–0.12)
BILIRUB SERPL-MCNC: 0.8 MG/DL (ref 0.1–1.5)
BUN SERPL-MCNC: 23 MG/DL (ref 8–22)
CALCIUM SERPL-MCNC: 9.5 MG/DL (ref 8.5–10.5)
CHLORIDE SERPL-SCNC: 107 MMOL/L (ref 96–112)
CHOLEST SERPL-MCNC: 111 MG/DL (ref 100–199)
CO2 SERPL-SCNC: 22 MMOL/L (ref 20–33)
CREAT SERPL-MCNC: 1.11 MG/DL (ref 0.5–1.4)
EOSINOPHIL # BLD AUTO: 0.35 K/UL (ref 0–0.51)
EOSINOPHIL NFR BLD: 5.1 % (ref 0–6.9)
ERYTHROCYTE [DISTWIDTH] IN BLOOD BY AUTOMATED COUNT: 47.9 FL (ref 35.9–50)
EST. AVERAGE GLUCOSE BLD GHB EST-MCNC: 134 MG/DL
GLOBULIN SER CALC-MCNC: 3.2 G/DL (ref 1.9–3.5)
GLUCOSE SERPL-MCNC: 93 MG/DL (ref 65–99)
HBA1C MFR BLD: 6.3 % (ref 0–5.6)
HCT VFR BLD AUTO: 48.1 % (ref 42–52)
HDLC SERPL-MCNC: 35 MG/DL
HGB BLD-MCNC: 16.1 G/DL (ref 14–18)
IMM GRANULOCYTES # BLD AUTO: 0.03 K/UL (ref 0–0.11)
IMM GRANULOCYTES NFR BLD AUTO: 0.4 % (ref 0–0.9)
LDLC SERPL CALC-MCNC: 56 MG/DL
LYMPHOCYTES # BLD AUTO: 2.23 K/UL (ref 1–4.8)
LYMPHOCYTES NFR BLD: 32.4 % (ref 22–41)
MCH RBC QN AUTO: 32.5 PG (ref 27–33)
MCHC RBC AUTO-ENTMCNC: 33.5 G/DL (ref 33.7–35.3)
MCV RBC AUTO: 97.2 FL (ref 81.4–97.8)
MONOCYTES # BLD AUTO: 0.63 K/UL (ref 0–0.85)
MONOCYTES NFR BLD AUTO: 9.2 % (ref 0–13.4)
NEUTROPHILS # BLD AUTO: 3.59 K/UL (ref 1.82–7.42)
NEUTROPHILS NFR BLD: 52.2 % (ref 44–72)
NRBC # BLD AUTO: 0 K/UL
NRBC BLD-RTO: 0 /100 WBC
PLATELET # BLD AUTO: 189 K/UL (ref 164–446)
PMV BLD AUTO: 10.4 FL (ref 9–12.9)
POTASSIUM SERPL-SCNC: 4.6 MMOL/L (ref 3.6–5.5)
PROT SERPL-MCNC: 7.5 G/DL (ref 6–8.2)
RBC # BLD AUTO: 4.95 M/UL (ref 4.7–6.1)
SODIUM SERPL-SCNC: 137 MMOL/L (ref 135–145)
TRIGL SERPL-MCNC: 102 MG/DL (ref 0–149)
TSH SERPL DL<=0.005 MIU/L-ACNC: 8.05 UIU/ML (ref 0.38–5.33)
WBC # BLD AUTO: 6.9 K/UL (ref 4.8–10.8)

## 2019-04-26 PROCEDURE — 84443 ASSAY THYROID STIM HORMONE: CPT

## 2019-04-26 PROCEDURE — 36415 COLL VENOUS BLD VENIPUNCTURE: CPT

## 2019-04-26 PROCEDURE — 80061 LIPID PANEL: CPT

## 2019-04-26 PROCEDURE — 83036 HEMOGLOBIN GLYCOSYLATED A1C: CPT | Mod: GZ

## 2019-04-26 PROCEDURE — 85025 COMPLETE CBC W/AUTO DIFF WBC: CPT

## 2019-04-26 PROCEDURE — 80053 COMPREHEN METABOLIC PANEL: CPT

## 2019-05-13 ENCOUNTER — HOSPITAL ENCOUNTER (OUTPATIENT)
Dept: CARDIOLOGY | Facility: MEDICAL CENTER | Age: 74
End: 2019-05-13
Attending: NURSE PRACTITIONER
Payer: MEDICARE

## 2019-05-13 DIAGNOSIS — R06.02 SOB (SHORTNESS OF BREATH): ICD-10-CM

## 2019-05-13 LAB
LV EJECT FRACT  99904: 55
LV EJECT FRACT MOD 2C 99903: 66.26
LV EJECT FRACT MOD 4C 99902: 62.14
LV EJECT FRACT MOD BP 99901: 63.54

## 2019-05-13 PROCEDURE — 93306 TTE W/DOPPLER COMPLETE: CPT | Mod: 26 | Performed by: INTERNAL MEDICINE

## 2019-05-13 PROCEDURE — 93306 TTE W/DOPPLER COMPLETE: CPT

## 2019-06-11 ENCOUNTER — OFFICE VISIT (OUTPATIENT)
Dept: MEDICAL GROUP | Facility: MEDICAL CENTER | Age: 74
End: 2019-06-11
Payer: MEDICARE

## 2019-06-11 VITALS
HEART RATE: 89 BPM | BODY MASS INDEX: 33.24 KG/M2 | OXYGEN SATURATION: 95 % | WEIGHT: 224.43 LBS | TEMPERATURE: 98.9 F | SYSTOLIC BLOOD PRESSURE: 150 MMHG | DIASTOLIC BLOOD PRESSURE: 70 MMHG | HEIGHT: 69 IN

## 2019-06-11 DIAGNOSIS — Z23 NEED FOR VACCINATION: ICD-10-CM

## 2019-06-11 DIAGNOSIS — I10 ESSENTIAL HYPERTENSION: ICD-10-CM

## 2019-06-11 DIAGNOSIS — E03.4 HYPOTHYROIDISM DUE TO ACQUIRED ATROPHY OF THYROID: ICD-10-CM

## 2019-06-11 DIAGNOSIS — R73.01 IMPAIRED FASTING GLUCOSE: ICD-10-CM

## 2019-06-11 DIAGNOSIS — Z11.59 NEED FOR HEPATITIS C SCREENING TEST: ICD-10-CM

## 2019-06-11 DIAGNOSIS — Z00.00 MEDICARE ANNUAL WELLNESS VISIT, SUBSEQUENT: ICD-10-CM

## 2019-06-11 DIAGNOSIS — Z86.79 H/O HEART FAILURE: ICD-10-CM

## 2019-06-11 DIAGNOSIS — I73.00 RAYNAUD'S PHENOMENON WITHOUT GANGRENE: ICD-10-CM

## 2019-06-11 DIAGNOSIS — E66.9 OBESITY (BMI 30-39.9): ICD-10-CM

## 2019-06-11 DIAGNOSIS — E78.5 DYSLIPIDEMIA: ICD-10-CM

## 2019-06-11 DIAGNOSIS — Z86.73 H/O TIA (TRANSIENT ISCHEMIC ATTACK) AND STROKE: ICD-10-CM

## 2019-06-11 PROCEDURE — 99213 OFFICE O/P EST LOW 20 MIN: CPT | Mod: 25 | Performed by: NURSE PRACTITIONER

## 2019-06-11 PROCEDURE — 90732 PPSV23 VACC 2 YRS+ SUBQ/IM: CPT | Performed by: NURSE PRACTITIONER

## 2019-06-11 PROCEDURE — G0009 ADMIN PNEUMOCOCCAL VACCINE: HCPCS | Performed by: NURSE PRACTITIONER

## 2019-06-11 PROCEDURE — G0439 PPPS, SUBSEQ VISIT: HCPCS | Performed by: NURSE PRACTITIONER

## 2019-06-11 RX ORDER — LEVOTHYROXINE SODIUM 0.07 MG/1
75 TABLET ORAL
Qty: 90 TAB | Refills: 3 | Status: SHIPPED | OUTPATIENT
Start: 2019-06-11 | End: 2019-12-03

## 2019-06-11 ASSESSMENT — ENCOUNTER SYMPTOMS: GENERAL WELL-BEING: GOOD

## 2019-06-11 ASSESSMENT — PATIENT HEALTH QUESTIONNAIRE - PHQ9: CLINICAL INTERPRETATION OF PHQ2 SCORE: 0

## 2019-06-11 ASSESSMENT — ACTIVITIES OF DAILY LIVING (ADL): BATHING_REQUIRES_ASSISTANCE: 0

## 2019-06-11 NOTE — PROGRESS NOTES
Chief Complaint   Patient presents with   • Results     labs/EKG   • Annual Wellness Visit         HPI:  Luke is a 73 y.o. here for Medicare Annual Wellness Visit as well as for elevated TSH, follow-up on shortness of breath with exertion and impaired fasting glucose.        1. Medicare annual wellness visit, subsequent  Screening performed below.    2. Hypothyroidism due to acquired atrophy of thyroid  TSH comes back elevated at 8.0 despite his levothyroxine 50 mcg a day.  He states he is taking it regularly but also takes it with his other medicines.    3. H/O heart failure  Patient has history of possible heart failure for which she was seen by cardiology last September.  He was complaining of continued problems with shortness of breath only on exertion and an echocardiogram was done which showed ejection fraction at 55% and considered basically unchanged from last year.  Patient reports no chest pain or increasing edema.    4. Impaired fasting glucose  Hemoglobin A1c comes back prediabetic at 6.3.  Patient has not needed diabetic medication previously but has been prediabetic according to his reports.    5. H/O TIA (transient ischemic attack) and stroke  Patient reports no new TIA symptoms.  He is taking his aspirin and blood pressure is under control and he is on his statin.    6. Dyslipidemia  Patient continues on high-dose Lipitor with no reports of myalgias and cholesterol is good.    7. Raynaud's phenomenon without gangrene  Patient reports his reynauds is currently under control with his amlodipine.    8. Essential hypertension  Blood pressure in the office is elevated but patient states it is consistently running below 130/80 at home.    9. Need for vaccination  Patient due for his second pneumonia vaccine.    10. Obesity (BMI 30-39.9)  Patient continues to be obese.    11. Need for hepatitis C screening test  Patient due for for screening.    Patient Active Problem List    Diagnosis Date Noted   •  Impaired fasting glucose 07/18/2018   • H/O heart failure 05/18/2018   • H/O TIA (transient ischemic attack) and stroke 05/18/2018   • Dyslipidemia 05/18/2018   • Raynaud's phenomenon without gangrene 05/18/2018   • Hypothyroidism due to acquired atrophy of thyroid 05/18/2018   • Essential hypertension 05/18/2018       Current Outpatient Prescriptions   Medication Sig Dispense Refill   • atorvastatin (LIPITOR) 80 MG tablet Take 1 Tab by mouth every evening. 90 Tab 3   • levothyroxine (SYNTHROID) 50 MCG Tab Take 1 Tab by mouth Every morning on an empty stomach. 90 Tab 2   • lisinopril (PRINIVIL) 10 MG Tab Take 1 Tab by mouth every day. 90 Tab 2   • amLODIPine (NORVASC) 5 MG Tab Take 1 Tab by mouth every day. 90 Tab 3   • aspirin (ASA) 81 MG Chew Tab chewable tablet Take 1 Tab by mouth every day. 100 Tab      No current facility-administered medications for this visit.         Patient is taking medications as noted in medication list.  Current supplements as per medication list.     Allergies: Patient has no known allergies.    Current social contact/activities: PT reports living with sister, visit with family and friends,      Is patient current with immunizations? No, due for PNEUMOVAX (PPSV23) and SHINGRIX (Shingles). Patient is interested in receiving PNEUMOVAX (PPSV23) today.    He  reports that he has quit smoking. His smoking use included Cigarettes. He has a 24.50 pack-year smoking history. He has never used smokeless tobacco. He reports that he drinks about 3.0 - 3.6 oz of alcohol per week . He reports that he does not use drugs.  Counseling given: Not Answered        DPA/Advanced directive: Patient has Advanced Directive, but it is not on file. Instructed to bring in a copy to scan into their chart.    ROS:    Gait: Uses no assistive device   Ostomy: No   Other tubes: No   Amputations: No   Chronic oxygen use No   Last eye exam 5+ year ago   Wears hearing aids: No   : Denies any urinary leakage during the  last 6 months     Depression Screening    Little interest or pleasure in doing things?  0 - not at all  Feeling down, depressed, or hopeless? 0 - not at all  Patient Health Questionnaire Score: 0    If depressive symptoms identified deferred to follow up visit unless specifically addressed in assessment and plan.    Interpretation of PHQ-9 Total Score   Score Severity   1-4 No Depression   5-9 Mild Depression   10-14 Moderate Depression   15-19 Moderately Severe Depression   20-27 Severe Depression    Screening for Cognitive Impairment    Three Minute Recall (village, kitchen, baby)  2/3    Javon clock face with all 12 numbers and set the hands to show 10 past 10.  No 3/5  If cognitive concerns identified, deferred for follow up unless specifically addressed in assessment and plan.    Fall Risk Assessment    Has the patient had two or more falls in the last year or any fall with injury in the last year?  No  If fall risk identified, deferred for follow up unless specifically addressed in assessment and plan.    Safety Assessment    Throw rugs on floor.  Yes  Handrails on all stairs.  Yes  Good lighting in all hallways.  Yes  Difficulty hearing.  No  Patient counseled about all safety risks that were identified.    Functional Assessment ADLs    Are there any barriers preventing you from cooking for yourself or meeting nutritional needs?  No.    Are there any barriers preventing you from driving safely or obtaining transportation?  No.    Are there any barriers preventing you from using a telephone or calling for help?  No.    Are there any barriers preventing you from shopping?  No.    Are there any barriers preventing you from taking care of your own finances?  No.    Are there any barriers preventing you from managing your medications?  No.    Are there any barriers preventing you from showering, bathing or dressing yourself?  No.    Are you currently engaging in any exercise or physical activity?  Yes.  PT reports  "swimming 1x per wk, house work, restoring an old car.  What is your perception of your health?  Good.    Health Maintenance Summary                Annual Wellness Visit Overdue 1945     HEPATITIS C SCREENING Overdue 1945     IMM PNEUMOCOCCAL 65+ (ADULT) LOW/MEDIUM RISK SERIES Overdue 4/18/2019      Done 4/18/2018 Imm Admin: Pneumococcal Conjugate Vaccine (Prevnar/PCV-13)     Patient has more history with this topic...    IMM ZOSTER VACCINES Postponed 4/12/2023 Originally 4/9/2015. System: vaccine not available, other system reasons     Done 2/12/2015 Imm Admin: Zoster Vaccine Live (ZVL) (Zostavax)    COLONOSCOPY Next Due 5/8/2022      Done 5/8/2012     IMM DTaP/Tdap/Td Vaccine Next Due 7/17/2028      Done 7/17/2018 Imm Admin: Tdap Vaccine     Patient has more history with this topic...          Patient Care Team:  MARY Valdovinos as PCP - General (Family Medicine)    Social History   Substance Use Topics   • Smoking status: Former Smoker     Packs/day: 0.50     Years: 49.00     Types: Cigarettes   • Smokeless tobacco: Never Used      Comment: Started at 19   • Alcohol use 3.0 - 3.6 oz/week     5 - 6 Shots of liquor per week     Family History   Problem Relation Age of Onset   • Alzheimer's Disease Father    • No Known Problems Sister    • Drug abuse Brother         Herroin/Hepatitis   • No Known Problems Maternal Grandmother    • No Known Problems Maternal Grandfather    • Stroke Paternal Grandmother      He  has no past medical history on file.   History reviewed. No pertinent surgical history.        Exam:     /70 (BP Location: Left arm, Patient Position: Sitting, BP Cuff Size: Adult)   Pulse 89   Temp 37.2 °C (98.9 °F)   Ht 1.753 m (5' 9\")   Wt 101.8 kg (224 lb 6.9 oz)   SpO2 95%  Body mass index is 33.14 kg/m².    Hearing good.    Dentition fair  Alert, oriented in no acute distress.  Eye contact is good, speech goal directed, affect calm  HEENT: TMs show no erythema, neck supple " negative lymphadenopathy or thyromegaly.  Heart: Regular rate and rhythm without murmur.  Lungs: Clear bilaterally to A&P and without wheeze or rhonchi.  Abdomen: Bowel sounds are present, negative bruit or masses  Extremities: Negative for cyanosis or edema.    Assessment and Plan. The following treatment and monitoring plan is recommended:        1. Medicare annual wellness visit, subsequent  Annual wellness topics discussed, review of chronic medical problems completed    - Initial Annual Wellness Visit - Includes PPPS ()    2. Hypothyroidism due to acquired atrophy of thyroid  Patient advised to take his levothyroxine on an empty stomach and without his other medicines and I will increase his Synthroid to 75 mcg a day.  He will do repeat lab work in 3 months with a goal of a TSH between 1 and 2.  - levothyroxine (SYNTHROID) 75 MCG Tab; Take 1 Tab by mouth Every morning on an empty stomach.  Dispense: 90 Tab; Refill: 3  - TSH; Future    3. H/O heart failure  Patient reminded he is due for his yearly follow-up with cardiology in September and we reviewed his echocardiogram report which is unchanged but still low normal at 55%.    4. Impaired fasting glucose  Patient warned that he is very close to needing medication for diabetes.  I discussed options and he would prefer to try to lose weight and follow a low-carb diet.  I told him if his hemoglobin A1c goes at 6.4 or higher, he will need medication.  - Comp Metabolic Panel; Future  - HEMOGLOBIN A1C; Future    5. H/O TIA (transient ischemic attack) and stroke  No new TIA symptoms and patient continues on preventative medicines.    6. Dyslipidemia  Patient to continue with high-dose statin with good cholesterol readings.    7. Raynaud's phenomenon without gangrene  Patient will continue with his amlodipine which is helpful.    8. Essential hypertension  Although blood pressure is elevated in the office, he reports his readings outside the office are always  normal    9. Need for vaccination  I have placed the below orders and discussed them with an approved delegating provider. The MA is performing the below orders under the direction of Dr. Topete    - Pneumococal Polysaccharide Vaccine 23-Valent =>3YO SQ/IM    10. Obesity (BMI 30-39.9)    - Patient identified as having weight management issue.  Appropriate orders and counseling given.    11. Need for hepatitis C screening test    - HEP C VIRUS ANTIBODY; Future    Services suggested: No services needed at this time  Health Care Screening recommendations as per orders if indicated.  Referrals offered: PT/OT/Nutrition counseling/Behavioral Health/Smoking cessation as per orders if indicated.    Discussion today about general wellness and lifestyle habits:    · Prevent falls and reduce trip hazards; Cautioned about securing or removing rugs.  · Have a working fire alarm and carbon monoxide detector;   · Engage in regular physical activity and social activities       Follow-up: No Follow-up on file.

## 2019-08-08 ENCOUNTER — OFFICE VISIT (OUTPATIENT)
Dept: DERMATOLOGY | Facility: IMAGING CENTER | Age: 74
End: 2019-08-08
Payer: MEDICARE

## 2019-08-08 VITALS — BODY MASS INDEX: 33.33 KG/M2 | TEMPERATURE: 98.9 F | HEIGHT: 69 IN | WEIGHT: 225 LBS

## 2019-08-08 DIAGNOSIS — I78.1 TELANGIECTASIA: ICD-10-CM

## 2019-08-08 DIAGNOSIS — L57.3 POIKILODERMA CIVATTE'S: ICD-10-CM

## 2019-08-08 DIAGNOSIS — L57.0 ACTINIC KERATOSIS: ICD-10-CM

## 2019-08-08 PROCEDURE — 17003 DESTRUCT PREMALG LES 2-14: CPT | Performed by: DERMATOLOGY

## 2019-08-08 PROCEDURE — 17000 DESTRUCT PREMALG LESION: CPT | Performed by: DERMATOLOGY

## 2019-08-08 PROCEDURE — 99202 OFFICE O/P NEW SF 15 MIN: CPT | Mod: 25 | Performed by: DERMATOLOGY

## 2019-08-08 NOTE — PROGRESS NOTES
CC:  Skin lesion    Subjective: new patient here for scalp/head exam.     History of skin cancer: No  History of precancers/actinic keratoses: Yes, Details: forehead  History of biopsies:No  History of blistering/severe sunburns:No  Family history of skin cancer:No  Family history of atypical moles:No    ROS: no fevers/chills. No itch.    DermPMH: no skin cancer/melanoma  No problem-specific Assessment & Plan notes found for this encounter.    Relevant PMH: Raynaud's, hypothyroidism  Social: former smoker; sunprotects, sometimes sweats off outdoors    PE: Gen:WDWN male in NAD.  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    F/u 1 year

## 2019-09-19 ENCOUNTER — HOSPITAL ENCOUNTER (OUTPATIENT)
Dept: LAB | Facility: MEDICAL CENTER | Age: 74
End: 2019-09-19
Attending: NURSE PRACTITIONER
Payer: MEDICARE

## 2019-09-19 DIAGNOSIS — Z11.59 NEED FOR HEPATITIS C SCREENING TEST: ICD-10-CM

## 2019-09-19 DIAGNOSIS — E03.4 HYPOTHYROIDISM DUE TO ACQUIRED ATROPHY OF THYROID: ICD-10-CM

## 2019-09-19 DIAGNOSIS — R73.01 IMPAIRED FASTING GLUCOSE: ICD-10-CM

## 2019-09-19 LAB
ALBUMIN SERPL BCP-MCNC: 4.5 G/DL (ref 3.2–4.9)
ALBUMIN/GLOB SERPL: 1.4 G/DL
ALP SERPL-CCNC: 68 U/L (ref 30–99)
ALT SERPL-CCNC: 22 U/L (ref 2–50)
ANION GAP SERPL CALC-SCNC: 9 MMOL/L (ref 0–11.9)
AST SERPL-CCNC: 24 U/L (ref 12–45)
BILIRUB SERPL-MCNC: 0.8 MG/DL (ref 0.1–1.5)
BUN SERPL-MCNC: 24 MG/DL (ref 8–22)
CALCIUM SERPL-MCNC: 9.7 MG/DL (ref 8.5–10.5)
CHLORIDE SERPL-SCNC: 103 MMOL/L (ref 96–112)
CO2 SERPL-SCNC: 26 MMOL/L (ref 20–33)
CREAT SERPL-MCNC: 1.11 MG/DL (ref 0.5–1.4)
EST. AVERAGE GLUCOSE BLD GHB EST-MCNC: 120 MG/DL
FASTING STATUS PATIENT QL REPORTED: NORMAL
GLOBULIN SER CALC-MCNC: 3.3 G/DL (ref 1.9–3.5)
GLUCOSE SERPL-MCNC: 82 MG/DL (ref 65–99)
HBA1C MFR BLD: 5.8 % (ref 0–5.6)
POTASSIUM SERPL-SCNC: 4.5 MMOL/L (ref 3.6–5.5)
PROT SERPL-MCNC: 7.8 G/DL (ref 6–8.2)
SODIUM SERPL-SCNC: 138 MMOL/L (ref 135–145)

## 2019-09-19 PROCEDURE — 83036 HEMOGLOBIN GLYCOSYLATED A1C: CPT | Mod: GA

## 2019-09-19 PROCEDURE — 36415 COLL VENOUS BLD VENIPUNCTURE: CPT | Mod: GA

## 2019-09-19 PROCEDURE — 84443 ASSAY THYROID STIM HORMONE: CPT

## 2019-09-19 PROCEDURE — 80053 COMPREHEN METABOLIC PANEL: CPT

## 2019-09-19 PROCEDURE — 86803 HEPATITIS C AB TEST: CPT

## 2019-09-20 DIAGNOSIS — E03.4 HYPOTHYROIDISM DUE TO ACQUIRED ATROPHY OF THYROID: ICD-10-CM

## 2019-09-20 LAB
HCV AB SER QL: NEGATIVE
TSH SERPL DL<=0.005 MIU/L-ACNC: 7.32 UIU/ML (ref 0.38–5.33)

## 2019-09-20 RX ORDER — LEVOTHYROXINE SODIUM 88 UG/1
88 TABLET ORAL
Qty: 90 TAB | Refills: 3 | Status: SHIPPED | OUTPATIENT
Start: 2019-09-20 | End: 2020-07-15

## 2019-10-29 ENCOUNTER — HOSPITAL ENCOUNTER (OUTPATIENT)
Dept: LAB | Facility: MEDICAL CENTER | Age: 74
End: 2019-10-29
Attending: FAMILY MEDICINE
Payer: MEDICARE

## 2019-10-29 LAB
T4 FREE SERPL-MCNC: 0.93 NG/DL (ref 0.53–1.43)
TSH SERPL DL<=0.005 MIU/L-ACNC: 4.94 UIU/ML (ref 0.38–5.33)

## 2019-10-29 PROCEDURE — 84439 ASSAY OF FREE THYROXINE: CPT

## 2019-10-29 PROCEDURE — 84443 ASSAY THYROID STIM HORMONE: CPT

## 2019-10-29 PROCEDURE — 36415 COLL VENOUS BLD VENIPUNCTURE: CPT

## 2019-12-03 ENCOUNTER — OFFICE VISIT (OUTPATIENT)
Dept: URGENT CARE | Facility: PHYSICIAN GROUP | Age: 74
End: 2019-12-03
Payer: MEDICARE

## 2019-12-03 VITALS
BODY MASS INDEX: 33.33 KG/M2 | SYSTOLIC BLOOD PRESSURE: 128 MMHG | TEMPERATURE: 98.3 F | DIASTOLIC BLOOD PRESSURE: 76 MMHG | OXYGEN SATURATION: 94 % | HEART RATE: 81 BPM | HEIGHT: 69 IN | WEIGHT: 225 LBS

## 2019-12-03 DIAGNOSIS — J06.9 URI WITH COUGH AND CONGESTION: ICD-10-CM

## 2019-12-03 DIAGNOSIS — J40 BRONCHITIS: Primary | ICD-10-CM

## 2019-12-03 PROCEDURE — 99214 OFFICE O/P EST MOD 30 MIN: CPT | Performed by: PHYSICIAN ASSISTANT

## 2019-12-03 RX ORDER — DEXTROMETHORPHAN HYDROBROMIDE AND PROMETHAZINE HYDROCHLORIDE 15; 6.25 MG/5ML; MG/5ML
5 SYRUP ORAL EVERY 4 HOURS PRN
Qty: 120 ML | Refills: 0 | Status: SHIPPED | OUTPATIENT
Start: 2019-12-03 | End: 2020-03-22 | Stop reason: SDUPTHER

## 2019-12-03 RX ORDER — DOXYCYCLINE HYCLATE 100 MG
100 TABLET ORAL 2 TIMES DAILY
Qty: 14 TAB | Refills: 0 | Status: SHIPPED | OUTPATIENT
Start: 2019-12-03 | End: 2020-09-18

## 2019-12-03 RX ORDER — ALBUTEROL SULFATE 90 UG/1
2 AEROSOL, METERED RESPIRATORY (INHALATION) EVERY 6 HOURS PRN
Qty: 8.5 G | Refills: 0 | Status: SHIPPED | OUTPATIENT
Start: 2019-12-03 | End: 2019-12-13

## 2019-12-03 RX ORDER — PREDNISONE 20 MG/1
TABLET ORAL
Qty: 23 TAB | Refills: 0 | Status: SHIPPED | OUTPATIENT
Start: 2019-12-03 | End: 2020-09-18

## 2019-12-03 NOTE — PROGRESS NOTES
Subjective:      Pt is a 74 y.o. male who presents with Cough (cough, congestion, x1.5 wks, taking mucinex, laryngitis)            HPI  This is a new problem. PT presents to  clinic today complaining of sore throat,  pressure in ears, cough, fatigue, runny nose, wheezing and SOB. PT denies CP, NVD, abdominal pain, joint pain. Pt notes mucinex is mildly helping. PT states these symptoms began around 1.5 weeks ago. PT states the pain is a 7/10 with coughing fits, aching in nature and worse at night. Pt has not taken any RX medications for this condition. The pt's medication list, problem list, and allergies have been evaluated and reviewed during today's visit.      PMH:  Negative per pt.      PSH:  Negative per pt.      Fam Hx:    family history includes Alzheimer's Disease in his father; Drug abuse in his brother; No Known Problems in his maternal grandfather, maternal grandmother, and sister; Stroke in his paternal grandmother.  Family Status   Relation Name Status   • Mo -         internal bleeding   • Fa 83-84    • Sis Tamea Alive   • Bro Aniceto 60    • MGMo 90s    • MGFa     • PGMo     • PGFa          Drowned       Soc HX:  Social History     Socioeconomic History   • Marital status: Single     Spouse name: Not on file   • Number of children: Not on file   • Years of education: Not on file   • Highest education level: Not on file   Occupational History   • Not on file   Social Needs   • Financial resource strain: Not on file   • Food insecurity:     Worry: Not on file     Inability: Not on file   • Transportation needs:     Medical: Not on file     Non-medical: Not on file   Tobacco Use   • Smoking status: Former Smoker     Packs/day: 0.50     Years: 49.00     Pack years: 24.50     Types: Cigarettes   • Smokeless tobacco: Never Used   • Tobacco comment: Started at 19   Substance and Sexual Activity   • Alcohol use: Not Currently     Alcohol/week: 3.0 -  3.6 oz     Types: 5 - 6 Shots of liquor per week   • Drug use: No   • Sexual activity: Never     Partners: Female   Lifestyle   • Physical activity:     Days per week: Not on file     Minutes per session: Not on file   • Stress: Not on file   Relationships   • Social connections:     Talks on phone: Not on file     Gets together: Not on file     Attends Judaism service: Not on file     Active member of club or organization: Not on file     Attends meetings of clubs or organizations: Not on file     Relationship status: Not on file   • Intimate partner violence:     Fear of current or ex partner: Not on file     Emotionally abused: Not on file     Physically abused: Not on file     Forced sexual activity: Not on file   Other Topics Concern   • Not on file   Social History Narrative   • Not on file         Medications:    Current Outpatient Medications:   •  doxycycline (VIBRAMYCIN) 100 MG Tab, Take 1 Tab by mouth 2 times a day., Disp: 14 Tab, Rfl: 0  •  promethazine-dextromethorphan (PROMETHAZINE-DM) 6.25-15 MG/5ML syrup, Take 5 mL by mouth every four hours as needed., Disp: 120 mL, Rfl: 0  •  predniSONE (DELTASONE) 20 MG Tab, Take 3 tabs at once PO daily x 5 days, then take 2 tabs at once daily x 3 days, then take 1 tab PO daily x 2 days, Disp: 23 Tab, Rfl: 0  •  albuterol 108 (90 Base) MCG/ACT Aero Soln inhalation aerosol, Inhale 2 Puffs by mouth every 6 hours as needed for Shortness of Breath for up to 10 days., Disp: 8.5 g, Rfl: 0  •  levothyroxine (SYNTHROID) 88 MCG Tab, Take 1 Tab by mouth Every morning on an empty stomach., Disp: 90 Tab, Rfl: 3  •  atorvastatin (LIPITOR) 80 MG tablet, Take 1 Tab by mouth every evening., Disp: 90 Tab, Rfl: 3  •  lisinopril (PRINIVIL) 10 MG Tab, Take 1 Tab by mouth every day., Disp: 90 Tab, Rfl: 2  •  amLODIPine (NORVASC) 5 MG Tab, Take 1 Tab by mouth every day., Disp: 90 Tab, Rfl: 3  •  aspirin (ASA) 81 MG Chew Tab chewable tablet, Take 1 Tab by mouth every day., Disp: 100  "Tab, Rfl:       Allergies:  Patient has no known allergies.    ROS    Review of Systems   Constitutional: Positive for malaise/fatigue. Negative for fever and diaphoresis.   HENT: Positive for congestion and sore throat. Negative for ear discharge, hearing loss, nosebleeds and tinnitus.    Eyes: Negative for blurred vision, double vision and photophobia.   Respiratory: Positive for cough, sputum production, shortness of breath and wheezing. Negative for hemoptysis.    Cardiovascular: Negative for chest pain and palpitations.   Gastrointestinal: Negative for nausea, vomiting, abdominal pain, diarrhea and constipation.   Genitourinary: Negative for dysuria and flank pain.   Musculoskeletal: Negative for joint pain and myalgias.   Skin: Negative for itching and rash.   Neurological:  Negative for dizziness, tingling and weakness.   Endo/Heme/Allergies: Does not bruise/bleed easily.   Psychiatric/Behavioral: Negative for depression. The patient is not nervous/anxious.           Objective:     /76   Pulse 81   Temp 36.8 °C (98.3 °F)   Ht 1.753 m (5' 9\")   Wt 102.1 kg (225 lb)   SpO2 94%   BMI 33.23 kg/m²      Physical Exam    Physical Exam   Constitutional: PT is oriented to person, place, and time. PT appears well-developed and well-nourished. No distress.   HENT:   Head: Normocephalic and atraumatic.   Right Ear: Hearing, tympanic membrane, external ear and ear canal normal.   Left Ear: Hearing, tympanic membrane, external ear and ear canal normal.   Nose: Mucosal edema, rhinorrhea and sinus tenderness present. Right sinus exhibits frontal sinus tenderness. Left sinus exhibits frontal sinus tenderness.   Mouth/Throat: Uvula is midline. Mucous membranes are pale. Posterior oropharyngeal edema and posterior oropharyngeal erythema present. No oropharyngeal exudate.   Eyes: Conjunctivae normal and EOM are normal. Pupils are equal, round, and reactive to light. Right eye exhibits no discharge. Left eye exhibits " no discharge.   Neck: Normal range of motion. Neck supple. No thyromegaly present.   Cardiovascular: Normal rate, regular rhythm, normal heart sounds and intact distal pulses.  Exam reveals no gallop and no friction rub.    No murmur heard.  Pulmonary/Chest: Effort normal. No respiratory distress. PT has wheezes. PT has no rales. PT exhibits tenderness.   Abdominal: Soft. Bowel sounds are normal. PT exhibits no distension and no mass. There is no tenderness. There is no rebound and no guarding.   Musculoskeletal: Normal range of motion. PT exhibits no edema and no tenderness.   Lymphadenopathy:     PT has no cervical adenopathy.   Neurological: Pt is alert and oriented to person, place, and time. Pt has normal reflexes. No cranial nerve deficit.   Skin: Skin is warm and dry. No rash noted. No erythema.   Psychiatric: PT has a normal mood and affect. Pt behavior is normal. Judgment and thought content normal.            Assessment/Plan:       1. Bronchitis    - doxycycline (VIBRAMYCIN) 100 MG Tab; Take 1 Tab by mouth 2 times a day.  Dispense: 14 Tab; Refill: 0  - predniSONE (DELTASONE) 20 MG Tab; Take 3 tabs at once PO daily x 5 days, then take 2 tabs at once daily x 3 days, then take 1 tab PO daily x 2 days  Dispense: 23 Tab; Refill: 0    2. URI with cough and congestion    - promethazine-dextromethorphan (PROMETHAZINE-DM) 6.25-15 MG/5ML syrup; Take 5 mL by mouth every four hours as needed.  Dispense: 120 mL; Refill: 0  - predniSONE (DELTASONE) 20 MG Tab; Take 3 tabs at once PO daily x 5 days, then take 2 tabs at once daily x 3 days, then take 1 tab PO daily x 2 days  Dispense: 23 Tab; Refill: 0  - albuterol 108 (90 Base) MCG/ACT Aero Soln inhalation aerosol; Inhale 2 Puffs by mouth every 6 hours as needed for Shortness of Breath for up to 10 days.  Dispense: 8.5 g; Refill: 0      Concern for worsening symptoms of URI which shortly could transition to pneumonia and worsening sinus congestion and infection with  powerful cough keeping pt up at night as they must sleep upright to avoid coughing fits.  Diff DX: Bronchitis, Sinusitis, Pneumonia, Influenza, Viral URI, Allergies  Rest, fluids encouraged.  OTC decongestant for congestion/cough  AVS with medical info given.  Pt was in full understanding and agreement with the plan.  Differential diagnosis, natural history, supportive care, and indications for immediate follow-up discussed. All questions answered. Patient agrees with the plan of care.  Follow-up as needed if symptoms worsen or fail to improve to PCP, Urgent care or Emergency Room.

## 2019-12-03 NOTE — PATIENT INSTRUCTIONS
Acute Bronchitis, Adult  Acute bronchitis is when air tubes (bronchi) in the lungs suddenly get swollen. The condition can make it hard to breathe. It can also cause these symptoms:  · A cough.  · Coughing up clear, yellow, or green mucus.  · Wheezing.  · Chest congestion.  · Shortness of breath.  · A fever.  · Body aches.  · Chills.  · A sore throat.  Follow these instructions at home:  Medicines  · Take over-the-counter and prescription medicines only as told by your doctor.  · If you were prescribed an antibiotic medicine, take it as told by your doctor. Do not stop taking the antibiotic even if you start to feel better.  General instructions  · Rest.  · Drink enough fluids to keep your pee (urine) clear or pale yellow.  · Avoid smoking and secondhand smoke. If you smoke and you need help quitting, ask your doctor. Quitting will help your lungs heal faster.  · Use an inhaler, cool mist vaporizer, or humidifier as told by your doctor.  · Keep all follow-up visits as told by your doctor. This is important.  How is this prevented?  To lower your risk of getting this condition again:  · Wash your hands often with soap and water. If you cannot use soap and water, use hand .  · Avoid contact with people who have cold symptoms.  · Try not to touch your hands to your mouth, nose, or eyes.  · Make sure to get the flu shot every year.  Contact a doctor if:  · Your symptoms do not get better in 2 weeks.  Get help right away if:  · You cough up blood.  · You have chest pain.  · You have very bad shortness of breath.  · You become dehydrated.  · You faint (pass out) or keep feeling like you are going to pass out.  · You keep throwing up (vomiting).  · You have a very bad headache.  · Your fever or chills gets worse.  This information is not intended to replace advice given to you by your health care provider. Make sure you discuss any questions you have with your health care provider.  Document Released: 06/05/2009  Document Revised: 07/26/2017 Document Reviewed: 06/07/2017  [employer] Patient Education © 2017 [employer]

## 2020-01-08 RX ORDER — LISINOPRIL 10 MG/1
10 TABLET ORAL DAILY
Qty: 90 TAB | Refills: 1 | Status: SHIPPED | OUTPATIENT
Start: 2020-01-08 | End: 2020-07-07

## 2020-03-12 ENCOUNTER — TELEPHONE (OUTPATIENT)
Dept: MEDSURG UNIT | Facility: MEDICAL CENTER | Age: 75
End: 2020-03-12

## 2020-03-12 ENCOUNTER — OFFICE VISIT (OUTPATIENT)
Dept: URGENT CARE | Facility: CLINIC | Age: 75
End: 2020-03-12
Payer: MEDICARE

## 2020-03-12 VITALS
HEART RATE: 56 BPM | OXYGEN SATURATION: 94 % | BODY MASS INDEX: 32.58 KG/M2 | DIASTOLIC BLOOD PRESSURE: 82 MMHG | RESPIRATION RATE: 16 BRPM | SYSTOLIC BLOOD PRESSURE: 146 MMHG | TEMPERATURE: 98 F | WEIGHT: 220 LBS | HEIGHT: 69 IN

## 2020-03-12 DIAGNOSIS — J22 LRTI (LOWER RESPIRATORY TRACT INFECTION): ICD-10-CM

## 2020-03-12 DIAGNOSIS — R05.9 COUGH: ICD-10-CM

## 2020-03-12 PROCEDURE — 99214 OFFICE O/P EST MOD 30 MIN: CPT | Performed by: NURSE PRACTITIONER

## 2020-03-12 RX ORDER — DOXYCYCLINE HYCLATE 100 MG
100 TABLET ORAL 2 TIMES DAILY
Qty: 14 TAB | Refills: 0 | Status: SHIPPED | OUTPATIENT
Start: 2020-03-12 | End: 2020-03-19

## 2020-03-12 RX ORDER — DEXTROMETHORPHAN HYDROBROMIDE AND PROMETHAZINE HYDROCHLORIDE 15; 6.25 MG/5ML; MG/5ML
5 SYRUP ORAL EVERY 4 HOURS PRN
Qty: 120 ML | Refills: 0 | Status: SHIPPED | OUTPATIENT
Start: 2020-03-12 | End: 2020-07-15

## 2020-03-12 RX ORDER — PREDNISONE 10 MG/1
40 TABLET ORAL DAILY
Qty: 20 TAB | Refills: 0 | Status: SHIPPED | OUTPATIENT
Start: 2020-03-12 | End: 2020-03-17

## 2020-03-12 ASSESSMENT — FIBROSIS 4 INDEX: FIB4 SCORE: 2

## 2020-03-12 NOTE — TELEPHONE ENCOUNTER
1. Caller Name: Luke Davis                        Call Back Number: [phone]  [facility] PCP or Specialty Provider: Yes         2.  Does patient have any active symptoms of respiratory illness (fever OR cough OR shortness of breath)? Yes, the patient reports the following respiratory symptoms: cough and shortness of breath.   December diagnosed with bronchitis    3.  Does patient have any comoribidities? None     4.  In the last 30 days, has the patient traveled outside of the country OR in a high risk area within the  OR have any known contact with someone who has or is suspected to have COVID-19?  No.    5. Disposition: Advised to perform self care, monitor for worsening symptoms and to call back in 3 days if no improvement Will make a virtual visit    Note routed to PCP: VICTORINO only.

## 2020-03-13 ASSESSMENT — ENCOUNTER SYMPTOMS
FEVER: 0
CHILLS: 1
WHEEZING: 1
EYE PAIN: 0
VOMITING: 0
SHORTNESS OF BREATH: 1
DIZZINESS: 0
COUGH: 1
SORE THROAT: 0
NAUSEA: 0
RHINORRHEA: 1
MYALGIAS: 1

## 2020-03-13 NOTE — PROGRESS NOTES
"Subjective:   Luke Davis  is a 74 y.o. male who presents for Shortness of Breath (cough x 2 months )        Cough   This is a new problem. Episode onset: 2 months. The problem has been unchanged. The problem occurs constantly. The cough is non-productive. Associated symptoms include chills, myalgias, nasal congestion, postnasal drip, rhinorrhea, shortness of breath and wheezing. Pertinent negatives include no chest pain, ear pain, fever, rash or sore throat. He has tried OTC cough suppressant for the symptoms. The treatment provided no relief. His past medical history is significant for bronchitis. There is no history of pneumonia.     Review of Systems   Constitutional: Positive for chills and malaise/fatigue. Negative for fever.   HENT: Positive for postnasal drip and rhinorrhea. Negative for ear pain and sore throat.    Eyes: Negative for pain.   Respiratory: Positive for cough, shortness of breath and wheezing.    Cardiovascular: Negative for chest pain.   Gastrointestinal: Negative for nausea and vomiting.   Genitourinary: Negative for hematuria.   Musculoskeletal: Positive for myalgias.   Skin: Negative for rash.   Neurological: Negative for dizziness.     No Known Allergies   Objective:   /82   Pulse (!) 56   Temp 36.7 °C (98 °F)   Resp 16   Ht 1.753 m (5' 9\")   Wt 99.8 kg (220 lb)   SpO2 94%   BMI 32.49 kg/m²   Physical Exam  Vitals signs and nursing note reviewed.   Constitutional:       General: He is not in acute distress.     Appearance: He is well-developed.   HENT:      Head: Normocephalic and atraumatic.      Right Ear: Tympanic membrane and external ear normal.      Left Ear: Tympanic membrane and external ear normal.      Nose: Nose normal.      Right Sinus: No maxillary sinus tenderness or frontal sinus tenderness.      Left Sinus: No maxillary sinus tenderness or frontal sinus tenderness.      Mouth/Throat:      Mouth: Mucous membranes are moist.      Pharynx: Uvula midline. "      Tonsils: No tonsillar exudate or tonsillar abscesses.   Eyes:      General:         Right eye: No discharge.         Left eye: No discharge.      Conjunctiva/sclera: Conjunctivae normal.      Pupils: Pupils are equal, round, and reactive to light.   Cardiovascular:      Rate and Rhythm: Normal rate and regular rhythm.      Heart sounds: No murmur.   Pulmonary:      Effort: Pulmonary effort is normal. No respiratory distress.      Breath sounds: Wheezing present. No decreased breath sounds, rhonchi or rales.   Abdominal:      General: There is no distension.      Palpations: Abdomen is soft.      Tenderness: There is no abdominal tenderness.   Musculoskeletal: Normal range of motion.   Skin:     General: Skin is warm and dry.   Neurological:      General: No focal deficit present.      Mental Status: He is alert and oriented to person, place, and time. Mental status is at baseline.      Sensory: No sensory deficit.      Gait: Gait (gait at baseline) normal.      Deep Tendon Reflexes: Reflexes are normal and symmetric.   Psychiatric:         Judgment: Judgment normal.           Assessment/Plan:   1. LRTI (lower respiratory tract infection)  - doxycycline (VIBRAMYCIN) 100 MG Tab; Take 1 Tab by mouth 2 times a day for 7 days.  Dispense: 14 Tab; Refill: 0  - predniSONE (DELTASONE) 10 MG Tab; Take 4 Tabs by mouth every day for 5 days.  Dispense: 20 Tab; Refill: 0    2. Cough  - promethazine-dextromethorphan (PROMETHAZINE-DM) 6.25-15 MG/5ML syrup; Take 5 mL by mouth every four hours as needed.  Dispense: 120 mL; Refill: 0  Advised to continue supportive care with Tylenol and/or ibuprofen for fevers and discomfort. Increased fluids and electrolytes.  Differential diagnosis, natural history, supportive care, and indications for immediate follow-up discussed.

## 2020-03-16 ENCOUNTER — OFFICE VISIT (OUTPATIENT)
Dept: URGENT CARE | Facility: CLINIC | Age: 75
End: 2020-03-16
Payer: MEDICARE

## 2020-03-16 ENCOUNTER — APPOINTMENT (OUTPATIENT)
Dept: RADIOLOGY | Facility: IMAGING CENTER | Age: 75
End: 2020-03-16
Attending: EMERGENCY MEDICINE
Payer: MEDICARE

## 2020-03-16 VITALS
DIASTOLIC BLOOD PRESSURE: 72 MMHG | WEIGHT: 230 LBS | HEIGHT: 69 IN | SYSTOLIC BLOOD PRESSURE: 138 MMHG | RESPIRATION RATE: 14 BRPM | TEMPERATURE: 98.2 F | BODY MASS INDEX: 34.07 KG/M2 | OXYGEN SATURATION: 91 % | HEART RATE: 82 BPM

## 2020-03-16 DIAGNOSIS — J31.0 RHINITIS, UNSPECIFIED TYPE: ICD-10-CM

## 2020-03-16 DIAGNOSIS — I49.9 IRREGULAR HEART RHYTHM: ICD-10-CM

## 2020-03-16 DIAGNOSIS — R05.9 COUGH: ICD-10-CM

## 2020-03-16 DIAGNOSIS — J45.30 MILD PERSISTENT REACTIVE AIRWAY DISEASE WITHOUT COMPLICATION: ICD-10-CM

## 2020-03-16 PROCEDURE — 71046 X-RAY EXAM CHEST 2 VIEWS: CPT | Mod: TC | Performed by: EMERGENCY MEDICINE

## 2020-03-16 PROCEDURE — 99203 OFFICE O/P NEW LOW 30 MIN: CPT | Performed by: EMERGENCY MEDICINE

## 2020-03-16 RX ORDER — LEVALBUTEROL TARTRATE 45 UG/1
1-2 AEROSOL, METERED ORAL EVERY 6 HOURS PRN
Qty: 1 INHALER | Refills: 0 | Status: SHIPPED | OUTPATIENT
Start: 2020-03-16 | End: 2021-07-27

## 2020-03-16 RX ORDER — PREDNISONE 20 MG/1
40 TABLET ORAL DAILY
Qty: 10 TAB | Refills: 0 | Status: SHIPPED | OUTPATIENT
Start: 2020-03-16 | End: 2020-07-15

## 2020-03-16 RX ORDER — FLUTICASONE PROPIONATE 220 UG/1
1 AEROSOL, METERED RESPIRATORY (INHALATION) 2 TIMES DAILY
Qty: 1 INHALER | Refills: 0 | Status: SHIPPED | OUTPATIENT
Start: 2020-03-16 | End: 2021-07-27

## 2020-03-16 RX ORDER — INHALER, ASSIST DEVICES
1 SPACER (EA) MISCELLANEOUS ONCE
Qty: 1 EACH | Refills: 0 | Status: SHIPPED | OUTPATIENT
Start: 2020-03-16 | End: 2020-03-16

## 2020-03-16 ASSESSMENT — ENCOUNTER SYMPTOMS
SPUTUM PRODUCTION: 0
HEARTBURN: 0
SORE THROAT: 1
COUGH: 1
VOMITING: 0
SHORTNESS OF BREATH: 1
WHEEZING: 1
CHILLS: 0
SINUS PAIN: 0
FEVER: 0
HEMOPTYSIS: 0
DIARRHEA: 0
RHINORRHEA: 0

## 2020-03-16 ASSESSMENT — COPD QUESTIONNAIRES: COPD: 0

## 2020-03-16 ASSESSMENT — FIBROSIS 4 INDEX: FIB4 SCORE: 2

## 2020-03-16 NOTE — PROGRESS NOTES
Subjective:      Luke Davis is a 74 y.o. male who presents with Cough (x 2 weeks.  Pt. was Dx'd with LRTI on 03-12-20. He said that he needs more steroids. )            Cough   This is a recurrent problem. Episode onset: 3 months. The problem has been waxing and waning. The cough is non-productive. Associated symptoms include nasal congestion, a sore throat, shortness of breath and wheezing. Pertinent negatives include no chest pain, chills, fever, heartburn, hemoptysis, postnasal drip or rhinorrhea. The symptoms are aggravated by lying down. He has tried oral steroids and prescription cough suppressant for the symptoms. The treatment provided significant relief. There is no history of asthma, COPD or environmental allergies.   Onset of symptoms associated with apparent bronchitis, recurrent episodes treated with antibiotics steroids, cough medicine with partial improvement.  Worsened over the last 2 weeks again.    Review of Systems   Constitutional: Negative for chills and fever.   HENT: Positive for sore throat. Negative for postnasal drip, rhinorrhea and sinus pain.    Respiratory: Positive for cough, shortness of breath and wheezing. Negative for hemoptysis and sputum production.    Cardiovascular: Negative for chest pain.   Gastrointestinal: Negative for diarrhea, heartburn and vomiting.   Endo/Heme/Allergies: Negative for environmental allergies.     History reviewed. No pertinent past medical history.  History reviewed. No pertinent surgical history.   Allergy:  Patient has no known allergies.     Current Outpatient Medications:   •  predniSONE, 40 mg, Oral, DAILY  •  Flovent HFA, 1 Puff, Inhalation, BID  •  levalbuterol, 1-2 Puff, Inhalation, Q6HRS PRN  •  AeroChamber MV, 1 Each, Does not apply, Once  •  doxycycline, 100 mg, Oral, BID, Taking  •  predniSONE, 40 mg, Oral, DAILY, Taking  •  promethazine-dextromethorphan, 5 mL, Oral, Q4HRS PRN, Taking  •  lisinopril, 10 mg, Oral, DAILY, Taking  •   "levothyroxine, 88 mcg, Oral, AM ES, Taking  •  atorvastatin, 80 mg, Oral, Q EVENING, Taking  •  amLODIPine, 5 mg, Oral, DAILY, Taking  •  aspirin, 81 mg, Oral, DAILY, Taking  •  doxycycline, 100 mg, Oral, BID (Patient not taking: Reported on 3/12/2020), Not Taking  •  promethazine-dextromethorphan, 5 mL, Oral, Q4HRS PRN, Not Taking  •  predniSONE, Take 3 tabs at once PO daily x 5 days, then take 2 tabs at once daily x 3 days, then take 1 tab PO daily x 2 days, Not Taking   family history includes Alzheimer's Disease in his father; Drug abuse in his brother; No Known Problems in his maternal grandfather, maternal grandmother, and sister; Stroke in his paternal grandmother.   Social History     Tobacco Use   • Smoking status: Former Smoker     Packs/day: 0.50     Years: 49.00     Pack years: 24.50     Types: Cigarettes   • Smokeless tobacco: Never Used   • Tobacco comment: Started at 19   Substance Use Topics   • Alcohol use: Not Currently     Alcohol/week: 3.0 - 3.6 oz     Types: 5 - 6 Shots of liquor per week   • Drug use: No         Objective:     /72   Pulse 82   Temp 36.8 °C (98.2 °F) (Temporal)   Resp 14   Ht 1.753 m (5' 9\")   Wt 104.3 kg (230 lb)   SpO2 91%   BMI 33.97 kg/m²      Physical Exam  Constitutional:       General: He is not in acute distress.     Appearance: He is well-developed. He is not ill-appearing or toxic-appearing.   HENT:      Head: Normocephalic.      Jaw: No trismus.      Right Ear: External ear normal.      Left Ear: External ear normal.      Nose: Mucosal edema present. No rhinorrhea.      Mouth/Throat:      Pharynx: Uvula midline. No oropharyngeal exudate or posterior oropharyngeal erythema.   Eyes:      Conjunctiva/sclera: Conjunctivae normal.   Neck:      Musculoskeletal: Neck supple.      Vascular: No JVD.      Trachea: Trachea and phonation normal.   Cardiovascular:      Rate and Rhythm: Normal rate and regular rhythm. Frequent extrasystoles are present.     Heart " sounds: Normal heart sounds. No murmur.   Pulmonary:      Effort: No tachypnea, accessory muscle usage, prolonged expiration or respiratory distress.      Breath sounds: Examination of the right-lower field reveals rales. Examination of the left-lower field reveals rales. Rales present. No wheezing.   Musculoskeletal:      Right lower leg: No edema.      Left lower leg: No edema.   Lymphadenopathy:      Cervical: No cervical adenopathy.   Skin:     General: Skin is warm and dry.   Neurological:      Mental Status: He is alert and oriented to person, place, and time.   Psychiatric:         Behavior: Behavior is cooperative.            PMH HF, but not clear evidence of pulmonary edema; will refer to PCP for follow up.     Assessment/Plan:       1. Mild persistent reactive airway disease without complication  Rx prednisone, Flovent, Xopenex, Aerochamber  REF PCP  2. Cough  - DX-CHEST-2 VIEWS; Interpretation per radiologist:   1.  No focal pulmonary consolidation.  2.  Mild prominence of the pulmonary interstitium. No peripheral pneumonia identified.  3. Irregular heart rhythm  Normal sinus rhythm, PACs, left axis deviation, no significant ST-T wave abnormality- EKG - Clinic Performed  4. Rhinitis, unspecified type  INS daily

## 2020-03-16 NOTE — PROGRESS NOTES
"Subjective:      Luke Davis is a 74 y.o. male who presents with Cough (x 2 weeks.  Pt. was Dx'd with LRTI on 03-12-20. He said that he needs more steroids. )            Cough   This is a recurrent problem. Episode onset: 3 months. The problem has been waxing and waning. The cough is non-productive. Associated symptoms include nasal congestion, a sore throat, shortness of breath and wheezing. Pertinent negatives include no chest pain, chills, fever, heartburn, hemoptysis, postnasal drip or rhinorrhea. The symptoms are aggravated by lying down. He has tried oral steroids and prescription cough suppressant for the symptoms. The treatment provided significant relief. There is no history of asthma, COPD or environmental allergies.   Onset of symptoms associated with apparent bronchitis, recurrent episodes treated with antibiotics steroids, cough medicine with partial improvement.  Worsened over the last 2 weeks again.    Review of Systems   Constitutional: Negative for chills and fever.   HENT: Positive for sore throat. Negative for postnasal drip, rhinorrhea and sinus pain.    Respiratory: Positive for cough, shortness of breath and wheezing. Negative for hemoptysis and sputum production.    Cardiovascular: Negative for chest pain.   Gastrointestinal: Negative for diarrhea, heartburn and vomiting.   Endo/Heme/Allergies: Negative for environmental allergies.          Objective:     /72   Pulse 82   Temp 36.8 °C (98.2 °F) (Temporal)   Resp 14   Ht 1.753 m (5' 9\")   Wt 104.3 kg (230 lb)   SpO2 91%   BMI 33.97 kg/m²      Physical Exam  Constitutional:       General: He is not in acute distress.     Appearance: He is well-developed. He is not ill-appearing or toxic-appearing.   HENT:      Head: Normocephalic.      Jaw: No trismus.      Right Ear: External ear normal.      Left Ear: External ear normal.      Nose: Mucosal edema present. No rhinorrhea.      Mouth/Throat:      Pharynx: Uvula midline. No " oropharyngeal exudate or posterior oropharyngeal erythema.   Eyes:      Conjunctiva/sclera: Conjunctivae normal.   Neck:      Musculoskeletal: Neck supple.      Vascular: No JVD.      Trachea: Trachea and phonation normal.   Cardiovascular:      Rate and Rhythm: Normal rate and regular rhythm. Frequent extrasystoles are present.     Heart sounds: Normal heart sounds. No murmur.   Pulmonary:      Effort: No tachypnea, accessory muscle usage, prolonged expiration or respiratory distress.      Breath sounds: Examination of the right-lower field reveals rales. Examination of the left-lower field reveals rales. Rales present. No wheezing.   Musculoskeletal:      Right lower leg: No edema.      Left lower leg: No edema.   Lymphadenopathy:      Cervical: No cervical adenopathy.   Skin:     General: Skin is warm and dry.   Neurological:      Mental Status: He is alert and oriented to person, place, and time.   Psychiatric:         Behavior: Behavior is cooperative.                 Assessment/Plan:       1. Abnormal chest x-ray    2. Cough  - DX-CHEST-2 VIEWS; Interpretation per radiologist:     3. Irregular heart rhythm  Normal sinus rhythm, PACs, left axis deviation, no significant ST-T wave abnormality- EKG - Clinic Performed

## 2020-03-22 ENCOUNTER — APPOINTMENT (OUTPATIENT)
Dept: RADIOLOGY | Facility: IMAGING CENTER | Age: 75
End: 2020-03-22
Attending: PHYSICIAN ASSISTANT
Payer: MEDICARE

## 2020-03-22 ENCOUNTER — OFFICE VISIT (OUTPATIENT)
Dept: URGENT CARE | Facility: CLINIC | Age: 75
End: 2020-03-22
Payer: MEDICARE

## 2020-03-22 VITALS
BODY MASS INDEX: 32.99 KG/M2 | OXYGEN SATURATION: 93 % | DIASTOLIC BLOOD PRESSURE: 88 MMHG | HEART RATE: 84 BPM | SYSTOLIC BLOOD PRESSURE: 140 MMHG | WEIGHT: 223.4 LBS | TEMPERATURE: 98.8 F

## 2020-03-22 DIAGNOSIS — J98.8 RTI (RESPIRATORY TRACT INFECTION): ICD-10-CM

## 2020-03-22 DIAGNOSIS — R05.3 PERSISTENT COUGH: ICD-10-CM

## 2020-03-22 DIAGNOSIS — R07.81 RIB PAIN ON LEFT SIDE: ICD-10-CM

## 2020-03-22 PROCEDURE — 99214 OFFICE O/P EST MOD 30 MIN: CPT | Performed by: PHYSICIAN ASSISTANT

## 2020-03-22 PROCEDURE — 71046 X-RAY EXAM CHEST 2 VIEWS: CPT | Mod: TC | Performed by: PHYSICIAN ASSISTANT

## 2020-03-22 RX ORDER — DEXTROMETHORPHAN HYDROBROMIDE AND PROMETHAZINE HYDROCHLORIDE 15; 6.25 MG/5ML; MG/5ML
5 SYRUP ORAL EVERY 4 HOURS PRN
Qty: 120 ML | Refills: 0 | Status: SHIPPED | OUTPATIENT
Start: 2020-03-22 | End: 2020-07-15

## 2020-03-22 RX ORDER — ALBUTEROL SULFATE 90 UG/1
1-2 AEROSOL, METERED RESPIRATORY (INHALATION) EVERY 4 HOURS PRN
Qty: 1 INHALER | Refills: 0 | Status: SHIPPED | OUTPATIENT
Start: 2020-03-22 | End: 2022-11-15

## 2020-03-22 RX ORDER — AZITHROMYCIN 250 MG/1
TABLET, FILM COATED ORAL
Qty: 6 TAB | Refills: 0 | Status: SHIPPED | OUTPATIENT
Start: 2020-03-22 | End: 2020-07-15

## 2020-03-22 ASSESSMENT — ENCOUNTER SYMPTOMS
SORE THROAT: 0
NAUSEA: 0
FEVER: 0
WHEEZING: 1
SPUTUM PRODUCTION: 1
SWEATS: 0
TINGLING: 0
ABDOMINAL PAIN: 0
RHINORRHEA: 0
DIARRHEA: 0
CHILLS: 0
PALPITATIONS: 0
FOCAL WEAKNESS: 0
COUGH: 1
VOMITING: 0
MYALGIAS: 0
SENSORY CHANGE: 0
HEMOPTYSIS: 0
SINUS PAIN: 0
SHORTNESS OF BREATH: 1

## 2020-03-22 ASSESSMENT — FIBROSIS 4 INDEX: FIB4 SCORE: 2

## 2020-03-22 NOTE — PROGRESS NOTES
Subjective:      Luke Davis is a 74 y.o. male who presents with Cough (x3 weeks, cough off and on/ presistaint)            Cough   This is a new problem. Episode onset: 3 weeks  The problem has been waxing and waning. The problem occurs constantly. The cough is productive of sputum (yellow/clear). Associated symptoms include shortness of breath and wheezing. Pertinent negatives include no chest pain, chills, ear congestion, ear pain, fever, hemoptysis, myalgias, nasal congestion, rash, rhinorrhea, sore throat or sweats. Associated symptoms comments: Left rib pain from coughing- severe. Thinks he could have broken a rib . Exacerbated by: pain in rib aggravated by coughing. Coughing aggravated by lying down. He has tried a beta-agonist inhaler, oral steroids, prescription cough suppressant and steroid inhaler for the symptoms. The treatment provided mild relief. His past medical history is significant for bronchitis. There is no history of asthma or pneumonia.     No past medical history on file.    No past surgical history on file.      Family History   Problem Relation Age of Onset   • Alzheimer's Disease Father    • No Known Problems Sister    • Drug abuse Brother         Herroin/Hepatitis   • No Known Problems Maternal Grandmother    • No Known Problems Maternal Grandfather    • Stroke Paternal Grandmother        Patient has no known allergies.    Medications, Allergies, and current problem list reviewed today in Epic      Review of Systems   Constitutional: Positive for malaise/fatigue. Negative for chills and fever.   HENT: Negative for congestion, ear discharge, ear pain, rhinorrhea, sinus pain and sore throat.    Respiratory: Positive for cough, sputum production, shortness of breath and wheezing. Negative for hemoptysis.    Cardiovascular: Negative for chest pain, palpitations and leg swelling.   Gastrointestinal: Negative for abdominal pain, diarrhea, nausea and vomiting.   Musculoskeletal: Positive  for joint pain (left rib pain ). Negative for myalgias.   Skin: Negative for rash.   Neurological: Negative for tingling, sensory change and focal weakness.     All other systems reviewed and are negative.        Objective:     /88   Pulse 84   Temp 37.1 °C (98.8 °F) (Temporal)   Wt 101.3 kg (223 lb 6.4 oz)   SpO2 91%   BMI 32.99 kg/m²      Physical Exam  Constitutional:       General: He is not in acute distress.  HENT:      Head: Normocephalic and atraumatic.      Right Ear: Tympanic membrane, ear canal and external ear normal.      Left Ear: Tympanic membrane, ear canal and external ear normal.      Nose: Nose normal.   Eyes:      Conjunctiva/sclera: Conjunctivae normal.   Cardiovascular:      Rate and Rhythm: Normal rate and regular rhythm.      Heart sounds: Normal heart sounds. No murmur. No friction rub. No gallop.    Pulmonary:      Effort: Pulmonary effort is normal. No respiratory distress.      Breath sounds: No stridor. Wheezing and rhonchi present. No rales.   Chest:      Chest wall: Tenderness present.   Musculoskeletal:      Right lower leg: Edema (trace) present.      Left lower leg: Edema (trace) present.   Skin:     General: Skin is warm and dry.   Neurological:      General: No focal deficit present.      Mental Status: He is alert.   Psychiatric:         Mood and Affect: Mood normal.         Behavior: Behavior normal.         Thought Content: Thought content normal.         Judgment: Judgment normal.            3/22/2020 2:23 PM     HISTORY/REASON FOR EXAM:  Cough     TECHNIQUE/EXAM DESCRIPTION:  PA and lateral views of the chest.     COMPARISON:  3/16/2020     FINDINGS:     The cardiomediastinal silhouette is stable. Atherosclerotic calcification is seen. There is mild bibasilar atelectasis. No pleural effusion or pneumothorax is seen. Degenerative changes are seen in the spine.           IMPRESSION:     Bibasilar opacities likely represent atelectasis.     Atherosclerotic plaque.      Assessment/Plan:     1. RTI (respiratory tract infection)  DX-CHEST-2 VIEWS    albuterol 108 (90 Base) MCG/ACT Aero Soln inhalation aerosol    azithromycin (ZITHROMAX) 250 MG Tab    promethazine-dextromethorphan (PROMETHAZINE-DM) 6.25-15 MG/5ML syrup   2. Rib pain on left side  DX-CHEST-2 VIEWS    albuterol 108 (90 Base) MCG/ACT Aero Soln inhalation aerosol    azithromycin (ZITHROMAX) 250 MG Tab    promethazine-dextromethorphan (PROMETHAZINE-DM) 6.25-15 MG/5ML syrup         Discussed case with Dr. Nair.       Current Outpatient Medications:   •  albuterol 108 (90 Base) MCG/ACT Aero Soln inhalation aerosol, Inhale 1-2 Puffs by mouth every four hours as needed for Shortness of Breath., Disp: 1 Inhaler, Rfl: 0  •  azithromycin (ZITHROMAX) 250 MG Tab, Take as directed on package. 1 pack., Disp: 6 Tab, Rfl: 0  •  promethazine-dextromethorphan (PROMETHAZINE-DM) 6.25-15 MG/5ML syrup, Take 5 mL by mouth every four hours as needed., Disp: 120 mL, Rfl: 0  •  predniSONE (DELTASONE) 20 MG Tab, Take 2 Tabs by mouth every day., Disp: 10 Tab, Rfl: 0    Continue Flovent     ER if any worsening symptoms.      Differential diagnoses, Supportive care, and indications for immediate follow-up discussed with patient.   Instructed to return to clinic or nearest emergency department for any change in condition, further concerns, or worsening of symptoms.    The patient demonstrated a good understanding and agreed with the treatment plan.    China Cardona P.A.-C.

## 2020-04-03 DIAGNOSIS — Z86.79 H/O HEART FAILURE: ICD-10-CM

## 2020-04-03 DIAGNOSIS — I10 ESSENTIAL HYPERTENSION: ICD-10-CM

## 2020-04-03 DIAGNOSIS — E78.5 DYSLIPIDEMIA: ICD-10-CM

## 2020-04-04 RX ORDER — AMLODIPINE BESYLATE 5 MG/1
TABLET ORAL
Qty: 90 TAB | Refills: 0 | Status: SHIPPED | OUTPATIENT
Start: 2020-04-04 | End: 2020-07-07

## 2020-04-04 RX ORDER — ATORVASTATIN CALCIUM 80 MG/1
TABLET, FILM COATED ORAL
Qty: 90 TAB | Refills: 0 | Status: SHIPPED | OUTPATIENT
Start: 2020-04-04 | End: 2020-07-07

## 2020-07-07 DIAGNOSIS — E78.5 DYSLIPIDEMIA: ICD-10-CM

## 2020-07-07 DIAGNOSIS — I10 ESSENTIAL HYPERTENSION: ICD-10-CM

## 2020-07-07 DIAGNOSIS — Z86.79 H/O HEART FAILURE: ICD-10-CM

## 2020-07-07 RX ORDER — ATORVASTATIN CALCIUM 80 MG/1
TABLET, FILM COATED ORAL
Qty: 30 TAB | Refills: 0 | Status: SHIPPED | OUTPATIENT
Start: 2020-07-07 | End: 2020-08-02

## 2020-07-07 RX ORDER — LISINOPRIL 10 MG/1
TABLET ORAL
Qty: 30 TAB | Refills: 0 | Status: SHIPPED | OUTPATIENT
Start: 2020-07-07 | End: 2020-08-02

## 2020-07-07 RX ORDER — AMLODIPINE BESYLATE 5 MG/1
TABLET ORAL
Qty: 30 TAB | Refills: 0 | Status: SHIPPED | OUTPATIENT
Start: 2020-07-07 | End: 2020-08-02

## 2020-07-08 NOTE — TELEPHONE ENCOUNTER
appt has been scheduled for next week, her would like to know if you would like him to have any labwork done?

## 2020-07-15 ENCOUNTER — OFFICE VISIT (OUTPATIENT)
Dept: MEDICAL GROUP | Facility: MEDICAL CENTER | Age: 75
End: 2020-07-15
Payer: MEDICARE

## 2020-07-15 VITALS
OXYGEN SATURATION: 97 % | SYSTOLIC BLOOD PRESSURE: 134 MMHG | HEART RATE: 70 BPM | WEIGHT: 222 LBS | DIASTOLIC BLOOD PRESSURE: 76 MMHG | HEIGHT: 69 IN | BODY MASS INDEX: 32.88 KG/M2 | RESPIRATION RATE: 16 BRPM

## 2020-07-15 DIAGNOSIS — R73.01 IMPAIRED FASTING GLUCOSE: ICD-10-CM

## 2020-07-15 DIAGNOSIS — E03.4 HYPOTHYROIDISM DUE TO ACQUIRED ATROPHY OF THYROID: ICD-10-CM

## 2020-07-15 DIAGNOSIS — Z86.79 H/O HEART FAILURE: ICD-10-CM

## 2020-07-15 DIAGNOSIS — E78.5 DYSLIPIDEMIA: ICD-10-CM

## 2020-07-15 DIAGNOSIS — I10 ESSENTIAL HYPERTENSION: ICD-10-CM

## 2020-07-15 PROCEDURE — 99214 OFFICE O/P EST MOD 30 MIN: CPT | Performed by: NURSE PRACTITIONER

## 2020-07-15 ASSESSMENT — FIBROSIS 4 INDEX: FIB4 SCORE: 2

## 2020-07-15 ASSESSMENT — PATIENT HEALTH QUESTIONNAIRE - PHQ9: CLINICAL INTERPRETATION OF PHQ2 SCORE: 0

## 2020-07-15 NOTE — PROGRESS NOTES
Subjective:      Luke Davis is a 74 y.o. male who presents with Medication Refill        CC: Patient is here today for follow-up on hypothyroidism, hypertension, impaired fasting glucose and dyslipidemia.    HPI       1. Hypothyroidism due to acquired atrophy of thyroid  Patient's last visit to the office was in June of last year and his last blood work for TSH was in October and it was 4.9.  He states he is taking his levothyroxine 75 mcg with dosage change made at that time and he is due for lab work.    2. Essential hypertension  It appears his home and office blood pressures have been running good on his lisinopril and amlodipine.    3. Impaired fasting glucose  Previous blood sugar readings were slightly high and hemoglobin A1c came back at 5.8 and he reports no diabetic symptoms.    4. Dyslipidemia  Patient continues with his atorvastatin 80 mg and reports no side effects    5. H/O heart failure  Patient last seen by cardiology 2 years ago and admits he is due for follow-up.  At that time it was felt that he may not have heart failure and he was to follow back in 2019.  His last echocardiogram from a year ago showed ejection fraction of 55% but no change in function from the previous testing he states he has no shortness of breath or chest pain.  No past medical history on file.  Social History     Socioeconomic History   • Marital status: Single     Spouse name: Not on file   • Number of children: Not on file   • Years of education: Not on file   • Highest education level: Not on file   Occupational History   • Not on file   Social Needs   • Financial resource strain: Not on file   • Food insecurity     Worry: Not on file     Inability: Not on file   • Transportation needs     Medical: Not on file     Non-medical: Not on file   Tobacco Use   • Smoking status: Former Smoker     Packs/day: 0.50     Years: 49.00     Pack years: 24.50     Types: Cigarettes   • Smokeless tobacco: Never Used   • Tobacco  comment: Started at 19   Substance and Sexual Activity   • Alcohol use: Not Currently     Alcohol/week: 3.0 - 3.6 oz     Types: 5 - 6 Shots of liquor per week   • Drug use: No   • Sexual activity: Never     Partners: Female   Lifestyle   • Physical activity     Days per week: Not on file     Minutes per session: Not on file   • Stress: Not on file   Relationships   • Social connections     Talks on phone: Not on file     Gets together: Not on file     Attends [employer] service: Not on file     Active member of club or organization: Not on file     Attends meetings of clubs or organizations: Not on file     Relationship status: Not on file   • Intimate partner violence     Fear of current or ex partner: Not on file     Emotionally abused: Not on file     Physically abused: Not on file     Forced sexual activity: Not on file   Other Topics Concern   • Not on file   Social History Narrative   • Not on file     Current Outpatient Medications   Medication Sig Dispense Refill   • amLODIPine (NORVASC) 5 MG Tab Take 1 tablet by mouth once daily 30 Tab 0   • atorvastatin (LIPITOR) 80 MG tablet TAKE 1 TABLET BY MOUTH ONCE DAILY IN THE EVENING 30 Tab 0   • lisinopril (PRINIVIL) 10 MG Tab Take 1 tablet by mouth once daily 30 Tab 0   • albuterol 108 (90 Base) MCG/ACT Aero Soln inhalation aerosol Inhale 1-2 Puffs by mouth every four hours as needed for Shortness of Breath. 1 Inhaler 0   • fluticasone (FLOVENT HFA) 220 MCG/ACT Aerosol Inhale 1 Puff by mouth 2 times a day. 1 Inhaler 0   • levalbuterol (XOPENEX HFA) 45 MCG/ACT inhaler Inhale 1-2 Puffs by mouth every 6 hours as needed for Shortness of Breath. 1 Inhaler 0   • levothyroxine (SYNTHROID) 88 MCG Tab Take 1 Tab by mouth Every morning on an empty stomach. 90 Tab 3   • aspirin (ASA) 81 MG Chew Tab chewable tablet Take 1 Tab by mouth every day. 100 Tab    • EUTHYROX 75 MCG Tab TAKE 1 TABLET BY MOUTH ONCE DAILY IN THE MORNING ON AN EMPTY STOMACH 30 Tab 0   • azithromycin  "(ZITHROMAX) 250 MG Tab Take as directed on package. 1 pack. (Patient not taking: Reported on 7/15/2020) 6 Tab 0   • promethazine-dextromethorphan (PROMETHAZINE-DM) 6.25-15 MG/5ML syrup Take 5 mL by mouth every four hours as needed. (Patient not taking: Reported on 7/15/2020) 120 mL 0   • predniSONE (DELTASONE) 20 MG Tab Take 2 Tabs by mouth every day. (Patient not taking: Reported on 7/15/2020) 10 Tab 0   • promethazine-dextromethorphan (PROMETHAZINE-DM) 6.25-15 MG/5ML syrup Take 5 mL by mouth every four hours as needed. (Patient not taking: Reported on 7/15/2020) 120 mL 0   • doxycycline (VIBRAMYCIN) 100 MG Tab Take 1 Tab by mouth 2 times a day. (Patient not taking: Reported on 3/12/2020) 14 Tab 0   • predniSONE (DELTASONE) 20 MG Tab Take 3 tabs at once PO daily x 5 days, then take 2 tabs at once daily x 3 days, then take 1 tab PO daily x 2 days (Patient not taking: Reported on 3/12/2020) 23 Tab 0     No current facility-administered medications for this visit.      Family History   Problem Relation Age of Onset   • Alzheimer's Disease Father    • No Known Problems Sister    • Drug abuse Brother         Herroin/Hepatitis   • No Known Problems Maternal Grandmother    • No Known Problems Maternal Grandfather    • Stroke Paternal Grandmother          Review of Systems   All other systems reviewed and are negative.         Objective:     /76 (BP Location: Right arm, Patient Position: Sitting, BP Cuff Size: Adult)   Pulse 70   Resp 16   Ht 1.753 m (5' 9\")   Wt 100.7 kg (222 lb)   SpO2 97%   BMI 32.78 kg/m²      Physical Exam  Vitals signs and nursing note reviewed.   Constitutional:       General: He is not in acute distress.     Appearance: He is well-developed. He is not diaphoretic.   HENT:      Head: Normocephalic and atraumatic.      Right Ear: External ear normal.      Left Ear: External ear normal.      Nose: Nose normal.   Eyes:      General:         Right eye: No discharge.         Left eye: No " discharge.      Conjunctiva/sclera: Conjunctivae normal.   Neck:      Musculoskeletal: Normal range of motion and neck supple.      Thyroid: No thyromegaly.      Trachea: No tracheal deviation.   Cardiovascular:      Rate and Rhythm: Normal rate and regular rhythm.      Heart sounds: Normal heart sounds. No murmur.   Pulmonary:      Effort: Pulmonary effort is normal. No respiratory distress.      Breath sounds: Normal breath sounds. No wheezing or rales.   Lymphadenopathy:      Cervical: No cervical adenopathy.   Skin:     General: Skin is warm and dry.      Findings: No erythema or rash.   Neurological:      Mental Status: He is alert and oriented to person, place, and time.      Coordination: Coordination normal.   Psychiatric:         Behavior: Behavior normal.         Thought Content: Thought content normal.         Judgment: Judgment normal.                 Assessment/Plan:       1. Hypothyroidism due to acquired atrophy of thyroid  Patient currently on 75 mcg of levothyroxine and will continue this unless TSH numbers change.  - TSH; Future    2. Essential hypertension  Blood pressure currently well controlled and no change in medicines made.    3. Impaired fasting glucose  I advised patient he should have a hemoglobin A1c every 6 months to check for status of his prediabetes.  He will watch his carbohydrates and lose weight.  - Comp Metabolic Panel; Future  - HEMOGLOBIN A1C; Future    4. Dyslipidemia  Patient will do cholesterol test before next visit and continue on his statin  - Lipid Profile; Future    5. H/O heart failure  Patient reminded he is due for follow-up with cardiology and I will do an echocardiogram this year.  He is currently asymptomatic.  - EC-ECHOCARDIOGRAM COMPLETE W/O CONT; Future

## 2020-07-27 ENCOUNTER — HOSPITAL ENCOUNTER (OUTPATIENT)
Dept: CARDIOLOGY | Facility: MEDICAL CENTER | Age: 75
End: 2020-07-27
Attending: NURSE PRACTITIONER
Payer: MEDICARE

## 2020-07-27 DIAGNOSIS — Z86.79 H/O HEART FAILURE: ICD-10-CM

## 2020-07-27 LAB
LV EJECT FRACT  99904: 55
LV EJECT FRACT MOD 2C 99903: 48.37
LV EJECT FRACT MOD 4C 99902: 49.98
LV EJECT FRACT MOD BP 99901: 48.23

## 2020-07-27 PROCEDURE — 93306 TTE W/DOPPLER COMPLETE: CPT

## 2020-07-27 PROCEDURE — 93306 TTE W/DOPPLER COMPLETE: CPT | Mod: 26 | Performed by: INTERNAL MEDICINE

## 2020-07-31 ENCOUNTER — HOSPITAL ENCOUNTER (OUTPATIENT)
Dept: LAB | Facility: MEDICAL CENTER | Age: 75
End: 2020-07-31
Attending: NURSE PRACTITIONER
Payer: MEDICARE

## 2020-07-31 DIAGNOSIS — E03.4 HYPOTHYROIDISM DUE TO ACQUIRED ATROPHY OF THYROID: ICD-10-CM

## 2020-07-31 DIAGNOSIS — E78.5 DYSLIPIDEMIA: ICD-10-CM

## 2020-07-31 DIAGNOSIS — R73.01 IMPAIRED FASTING GLUCOSE: ICD-10-CM

## 2020-07-31 LAB
ALBUMIN SERPL BCP-MCNC: 4.4 G/DL (ref 3.2–4.9)
ALBUMIN/GLOB SERPL: 1.3 G/DL
ALP SERPL-CCNC: 75 U/L (ref 30–99)
ALT SERPL-CCNC: 27 U/L (ref 2–50)
ANION GAP SERPL CALC-SCNC: 12 MMOL/L (ref 7–16)
AST SERPL-CCNC: 26 U/L (ref 12–45)
BILIRUB SERPL-MCNC: 0.6 MG/DL (ref 0.1–1.5)
BUN SERPL-MCNC: 24 MG/DL (ref 8–22)
CALCIUM SERPL-MCNC: 9.5 MG/DL (ref 8.5–10.5)
CHLORIDE SERPL-SCNC: 103 MMOL/L (ref 96–112)
CHOLEST SERPL-MCNC: 141 MG/DL (ref 100–199)
CO2 SERPL-SCNC: 22 MMOL/L (ref 20–33)
CREAT SERPL-MCNC: 1.07 MG/DL (ref 0.5–1.4)
EST. AVERAGE GLUCOSE BLD GHB EST-MCNC: 123 MG/DL
FASTING STATUS PATIENT QL REPORTED: NORMAL
GLOBULIN SER CALC-MCNC: 3.3 G/DL (ref 1.9–3.5)
GLUCOSE SERPL-MCNC: 121 MG/DL (ref 65–99)
HBA1C MFR BLD: 5.9 % (ref 0–5.6)
HDLC SERPL-MCNC: 37 MG/DL
LDLC SERPL CALC-MCNC: 85 MG/DL
POTASSIUM SERPL-SCNC: 4.8 MMOL/L (ref 3.6–5.5)
PROT SERPL-MCNC: 7.7 G/DL (ref 6–8.2)
SODIUM SERPL-SCNC: 137 MMOL/L (ref 135–145)
TRIGL SERPL-MCNC: 94 MG/DL (ref 0–149)
TSH SERPL DL<=0.005 MIU/L-ACNC: 7.73 UIU/ML (ref 0.38–5.33)

## 2020-07-31 PROCEDURE — 80061 LIPID PANEL: CPT

## 2020-07-31 PROCEDURE — 80053 COMPREHEN METABOLIC PANEL: CPT

## 2020-07-31 PROCEDURE — 36415 COLL VENOUS BLD VENIPUNCTURE: CPT | Mod: GA

## 2020-07-31 PROCEDURE — 83036 HEMOGLOBIN GLYCOSYLATED A1C: CPT | Mod: GA

## 2020-07-31 PROCEDURE — 84443 ASSAY THYROID STIM HORMONE: CPT

## 2020-07-31 RX ORDER — LEVOTHYROXINE SODIUM 88 UG/1
88 TABLET ORAL
Qty: 90 TAB | Refills: 3 | Status: SHIPPED | OUTPATIENT
Start: 2020-07-31 | End: 2021-07-27 | Stop reason: SDUPTHER

## 2020-09-18 ENCOUNTER — OFFICE VISIT (OUTPATIENT)
Dept: DERMATOLOGY | Facility: IMAGING CENTER | Age: 75
End: 2020-09-18
Payer: MEDICARE

## 2020-09-18 DIAGNOSIS — L57.0 ACTINIC KERATOSIS: ICD-10-CM

## 2020-09-18 PROCEDURE — 17003 DESTRUCT PREMALG LES 2-14: CPT | Performed by: DERMATOLOGY

## 2020-09-18 PROCEDURE — 17000 DESTRUCT PREMALG LESION: CPT | Performed by: DERMATOLOGY

## 2020-09-18 NOTE — PROGRESS NOTES
CC: AK follow up    Subjective: Return patient here for AK treated with cryac on scalp at last visit Aug 2019    Pt feels the lesion is still on scalp.  Also has one new spot of concern.    HPI/location: right forearm rough spot  Time present: 1 month  Painful lesion: a little sensitive   Itching lesion: No  Enlarging lesion: No  Anything make it better or worse?n/a    History of skin cancer: No  History of precancers/actinic keratoses: Yes, Details: forehead  History of biopsies:No  History of blistering/severe sunburns:No  Family history of skin cancer:No  Family history of atypical moles:No    ROS: no fevers/chills. No itch.  No cough  DermPMH: no skin cancer/melanoma  No problem-specific Assessment & Plan notes found for this encounter.    Relevant PMH: Raynaud's, hypothyroidism  Social: former smoker; sunprotects, sometimes sweats off outdoors    PE: Gen:WDWN male in NAD.  Skin: focal: thin hyperkeratotic papules on erythematous base on scalp X 1 and right arm X 1.  Declined additional skin exam today    A/P: AKs:  -counseled on diagnosis and treatment, including risks/benefits/alternatives of cyrotherapy  -LN2 25 sec X 2 cycles X 2lesions  -f/u if persists at 1 month/PRN    Encouraged LOS in clinic.  Patient reports he is aware to schedule yearly exam.    I have reviewed medications relevant to my specialty.

## 2020-11-05 ENCOUNTER — TELEPHONE (OUTPATIENT)
Dept: MEDICAL GROUP | Facility: MEDICAL CENTER | Age: 75
End: 2020-11-05

## 2020-11-05 DIAGNOSIS — Z20.822 EXPOSURE TO COVID-19 VIRUS: ICD-10-CM

## 2020-11-05 NOTE — TELEPHONE ENCOUNTER
Phone Number Called: [phone]    Call outcome: Spoke to patient regarding message below.    Message: Called to inform patient COVID test was ordered and that he may use the drive through location to get his COVID swab done. Patient verbalizes understanding.

## 2020-11-05 NOTE — TELEPHONE ENCOUNTER
1. Caller Name: Luke Davis                       Call Back Number: [phone] (home)       Pt called, he states he was exposed to someone with COVID and would like an order to get tested. He said he is not currently having any symptoms.          How would the patient prefer to be contacted with a response: Phone call OK to leave a detailed message

## 2020-11-05 NOTE — TELEPHONE ENCOUNTER
Please advise patient that his COVID-19 test has been ordered and how to use the drive-through to be tested.

## 2020-11-07 ENCOUNTER — HOSPITAL ENCOUNTER (OUTPATIENT)
Dept: LAB | Facility: MEDICAL CENTER | Age: 75
End: 2020-11-07
Attending: NURSE PRACTITIONER
Payer: MEDICARE

## 2020-11-07 DIAGNOSIS — Z20.822 EXPOSURE TO COVID-19 VIRUS: ICD-10-CM

## 2020-11-07 PROCEDURE — U0003 INFECTIOUS AGENT DETECTION BY NUCLEIC ACID (DNA OR RNA); SEVERE ACUTE RESPIRATORY SYNDROME CORONAVIRUS 2 (SARS-COV-2) (CORONAVIRUS DISEASE [COVID-19]), AMPLIFIED PROBE TECHNIQUE, MAKING USE OF HIGH THROUGHPUT TECHNOLOGIES AS DESCRIBED BY CMS-2020-01-R: HCPCS

## 2020-11-07 PROCEDURE — C9803 HOPD COVID-19 SPEC COLLECT: HCPCS

## 2020-11-08 LAB — COVID ORDER STATUS COVID19: NORMAL

## 2020-11-09 LAB
SARS-COV-2 RNA RESP QL NAA+PROBE: NOTDETECTED
SPECIMEN SOURCE: NORMAL

## 2021-01-11 DIAGNOSIS — Z23 NEED FOR VACCINATION: ICD-10-CM

## 2021-02-06 ENCOUNTER — IMMUNIZATION (OUTPATIENT)
Dept: FAMILY PLANNING/WOMEN'S HEALTH CLINIC | Facility: IMMUNIZATION CENTER | Age: 76
End: 2021-02-06
Attending: INTERNAL MEDICINE
Payer: MEDICARE

## 2021-02-06 DIAGNOSIS — Z23 NEED FOR VACCINATION: ICD-10-CM

## 2021-02-06 DIAGNOSIS — Z23 ENCOUNTER FOR VACCINATION: Primary | ICD-10-CM

## 2021-02-06 PROCEDURE — 91301 MODERNA SARS-COV-2 VACCINE: CPT

## 2021-02-06 PROCEDURE — 0011A MODERNA SARS-COV-2 VACCINE: CPT

## 2021-03-05 ENCOUNTER — IMMUNIZATION (OUTPATIENT)
Dept: FAMILY PLANNING/WOMEN'S HEALTH CLINIC | Facility: IMMUNIZATION CENTER | Age: 76
End: 2021-03-05
Attending: INTERNAL MEDICINE
Payer: MEDICARE

## 2021-03-05 DIAGNOSIS — Z23 ENCOUNTER FOR VACCINATION: Primary | ICD-10-CM

## 2021-03-05 PROCEDURE — 0012A MODERNA SARS-COV-2 VACCINE: CPT

## 2021-03-05 PROCEDURE — 91301 MODERNA SARS-COV-2 VACCINE: CPT

## 2021-05-14 ENCOUNTER — PATIENT OUTREACH (OUTPATIENT)
Dept: HEALTH INFORMATION MANAGEMENT | Facility: OTHER | Age: 76
End: 2021-05-14

## 2021-05-14 NOTE — PROGRESS NOTES
Outcome: he will speak to his doctor before and call back if he is interested in scheduling     Please transfer to Patient Outreach Team at [phone] when patient returns call.        Attempt #1

## 2021-05-25 DIAGNOSIS — E78.5 DYSLIPIDEMIA: ICD-10-CM

## 2021-05-25 DIAGNOSIS — Z86.79 H/O HEART FAILURE: ICD-10-CM

## 2021-05-25 RX ORDER — ATORVASTATIN CALCIUM 80 MG/1
TABLET, FILM COATED ORAL
Qty: 90 TABLET | Refills: 0 | Status: SHIPPED | OUTPATIENT
Start: 2021-05-25 | End: 2021-07-27 | Stop reason: SDUPTHER

## 2021-07-14 ENCOUNTER — PATIENT OUTREACH (OUTPATIENT)
Dept: HEALTH INFORMATION MANAGEMENT | Facility: OTHER | Age: 76
End: 2021-07-14

## 2021-07-14 ENCOUNTER — PATIENT MESSAGE (OUTPATIENT)
Dept: HEALTH INFORMATION MANAGEMENT | Facility: OTHER | Age: 76
End: 2021-07-14

## 2021-07-14 NOTE — NON-PROVIDER
Attempt #: Final  [facility] Verified: yes  Verify PCP: yes      Annual Wellness Visit Scheduling/ Comprehensive Geriatric Assessment   1. Scheduling Status:Not Scheduled. Patient states they are not interested      Care Gap Scheduling (Attempt to Schedule EACH Overdue Care Gap!)  Health Maintenance Due   Topic Date Due   • Annual Wellness Visit  06/11/2020     Pt stated that he will call to schedule when needed.    MyChart Activation: already active

## 2021-07-27 ENCOUNTER — OFFICE VISIT (OUTPATIENT)
Dept: MEDICAL GROUP | Facility: MEDICAL CENTER | Age: 76
End: 2021-07-27
Payer: MEDICARE

## 2021-07-27 VITALS
RESPIRATION RATE: 16 BRPM | HEART RATE: 81 BPM | SYSTOLIC BLOOD PRESSURE: 118 MMHG | OXYGEN SATURATION: 92 % | TEMPERATURE: 97.4 F | DIASTOLIC BLOOD PRESSURE: 70 MMHG | BODY MASS INDEX: 33.11 KG/M2 | WEIGHT: 224.2 LBS

## 2021-07-27 DIAGNOSIS — E03.4 HYPOTHYROIDISM DUE TO ACQUIRED ATROPHY OF THYROID: ICD-10-CM

## 2021-07-27 DIAGNOSIS — E78.5 DYSLIPIDEMIA: ICD-10-CM

## 2021-07-27 DIAGNOSIS — I10 ESSENTIAL HYPERTENSION: ICD-10-CM

## 2021-07-27 DIAGNOSIS — Z86.79 H/O HEART FAILURE: ICD-10-CM

## 2021-07-27 PROCEDURE — 99213 OFFICE O/P EST LOW 20 MIN: CPT | Performed by: STUDENT IN AN ORGANIZED HEALTH CARE EDUCATION/TRAINING PROGRAM

## 2021-07-27 RX ORDER — ATORVASTATIN CALCIUM 80 MG/1
TABLET, FILM COATED ORAL
Qty: 90 TABLET | Refills: 3 | Status: SHIPPED | OUTPATIENT
Start: 2021-07-27 | End: 2022-08-02

## 2021-07-27 RX ORDER — AMLODIPINE BESYLATE 5 MG/1
TABLET ORAL
Qty: 90 TABLET | Refills: 3 | Status: SHIPPED | OUTPATIENT
Start: 2021-07-27 | End: 2022-08-02

## 2021-07-27 RX ORDER — LISINOPRIL 10 MG/1
TABLET ORAL
Qty: 90 TABLET | Refills: 3 | Status: SHIPPED | OUTPATIENT
Start: 2021-07-27 | End: 2022-08-02

## 2021-07-27 RX ORDER — LEVOTHYROXINE SODIUM 88 UG/1
88 TABLET ORAL
Qty: 90 TABLET | Refills: 3 | Status: SHIPPED | OUTPATIENT
Start: 2021-07-27 | End: 2022-08-02

## 2021-07-27 ASSESSMENT — PATIENT HEALTH QUESTIONNAIRE - PHQ9: CLINICAL INTERPRETATION OF PHQ2 SCORE: 0

## 2021-07-27 NOTE — ASSESSMENT & PLAN NOTE
Blood pressure remains controlled on lisinopril and amlodipine.  Patient's blood pressure today 118/70.

## 2021-07-27 NOTE — ASSESSMENT & PLAN NOTE
TSH elevated on last labs over 1 year ago.  Patient's levothyroxine was increased from 75 mcg to 88 mcg.  Patient is due for lab work.  Patient has not had labs since adjusting this dose.  She denies symptoms of hyper/hypothyroid.  Patient denies heat/cold intolerance, constipation/diarrhea, hair or skin changes, palpitations, weight changes.

## 2021-07-27 NOTE — PROGRESS NOTES
Subjective:     CC: Hypertension, hypothyroid, dyslipidemia follow-up    HPI:   Luke presents today with    Hypothyroidism due to acquired atrophy of thyroid  TSH elevated on last labs over 1 year ago.  Patient's levothyroxine was increased from 75 mcg to 88 mcg.  Patient is due for lab work.  Patient has not had labs since adjusting this dose.  She denies symptoms of hyper/hypothyroid.  Patient denies heat/cold intolerance, constipation/diarrhea, hair or skin changes, palpitations, weight changes.    Essential hypertension  Blood pressure remains controlled on lisinopril and amlodipine.  Patient's blood pressure today 118/70.    Dyslipidemia  Patient continues on atorvastatin 80 mg.  Patient continues to deny myalgias.  Patient needs updated labs.    H/O heart failure  Patient does not follow with cardiology.  Recent echo from 2020 and 2019 show that ejection fraction remained stable at 55%.  Patient is not following with cardiology.  Patient denies chest pain, shortness of breath.      Current Outpatient Medications Ordered in Epic   Medication Sig Dispense Refill   • amLODIPine (NORVASC) 5 MG Tab Take 1 tablet by mouth once daily 90 tablet 3   • atorvastatin (LIPITOR) 80 MG tablet TAKE 1 TABLET BY MOUTH ONCE DAILY IN THE EVENING 90 tablet 3   • levothyroxine (SYNTHROID) 88 MCG Tab Take 1 tablet by mouth every morning on an empty stomach. 90 tablet 3   • lisinopril (PRINIVIL) 10 MG Tab Take 1 tablet by mouth once daily 90 tablet 3   • albuterol 108 (90 Base) MCG/ACT Aero Soln inhalation aerosol Inhale 1-2 Puffs by mouth every four hours as needed for Shortness of Breath. 1 Inhaler 0   • aspirin (ASA) 81 MG Chew Tab chewable tablet Take 1 Tab by mouth every day. 100 Tab      No current Epic-ordered facility-administered medications on file.       Health Maintenance: Completed    ROS:  Gen: no fevers/chills, no changes in weight  Eyes: no changes in vision  ENT: no sore throat, no hearing loss, no bloody  nose  Pulm: no sob, no cough  CV: no chest pain, no palpitations  GI: no nausea/vomiting, no diarrhea  : no dysuria  MSk: no myalgias  Skin: no rash  Neuro: no headaches, no numbness/tingling  Heme/Lymph: no easy bruising      Objective:     Exam:  /70 (BP Location: Right arm, Patient Position: Sitting, BP Cuff Size: Adult)   Pulse 81   Temp 36.3 °C (97.4 °F)   Resp 16   Wt 102 kg (224 lb 3.2 oz)   SpO2 92%   BMI 33.11 kg/m²  Body mass index is 33.11 kg/m².    Gen: Alert and oriented, No apparent distress.  Neck: Neck is supple without lymphadenopathy.  Lungs: Normal effort, CTA bilaterally, no wheezes, rhonchi, or rales  CV: Regular rate and rhythm. No murmurs, rubs, or gallops.  Ext: No clubbing, cyanosis, edema.      Assessment & Plan:     75 y.o. male with the following -     1. Essential hypertension  Chronic, stable.  Patient continues on lisinopril and amlodipine.  Blood pressure well controlled today.  - amLODIPine (NORVASC) 5 MG Tab; Take 1 tablet by mouth once daily  Dispense: 90 tablet; Refill: 3  - Comp Metabolic Panel; Future  - CBC WITHOUT DIFFERENTIAL; Future    2. H/O heart failure  Chronic, stable.  Patient's last echo in 2020 showed that ejection fraction is remaining stable at 50%.  We will continue to follow to evaluate.  - atorvastatin (LIPITOR) 80 MG tablet; TAKE 1 TABLET BY MOUTH ONCE DAILY IN THE EVENING  Dispense: 90 tablet; Refill: 3    3. Dyslipidemia  Chronic, stable.  Patient continues on atorvastatin.  - atorvastatin (LIPITOR) 80 MG tablet; TAKE 1 TABLET BY MOUTH ONCE DAILY IN THE EVENING  Dispense: 90 tablet; Refill: 3  - Comp Metabolic Panel; Future  - Lipid Profile; Future    4. Hypothyroidism due to acquired atrophy of thyroid  Chronic, Stable.  Patient continues on levothyroxine 88 mcg.  Patient denies symptoms.  Patient will get labs to determine if medications need adjustment.  - levothyroxine (SYNTHROID) 88 MCG Tab; Take 1 tablet by mouth every morning on an empty  stomach.  Dispense: 90 tablet; Refill: 3  - TSH; Future  - FREE THYROXINE; Future      No follow-ups on file.    Please note that this dictation was created using voice recognition software. I have made every reasonable attempt to correct obvious errors, but I expect that there are errors of grammar and possibly content that I did not discover before finalizing the note.

## 2021-07-27 NOTE — ASSESSMENT & PLAN NOTE
Patient continues on atorvastatin 80 mg.  Patient continues to deny myalgias.  Patient needs updated labs.

## 2021-07-27 NOTE — ASSESSMENT & PLAN NOTE
Patient does not follow with cardiology.  Recent echo from 2020 and 2019 show that ejection fraction remained stable at 55%.  Patient is not following with cardiology.  Patient denies chest pain, shortness of breath.

## 2021-07-30 ENCOUNTER — HOSPITAL ENCOUNTER (OUTPATIENT)
Dept: LAB | Facility: MEDICAL CENTER | Age: 76
End: 2021-07-30
Attending: STUDENT IN AN ORGANIZED HEALTH CARE EDUCATION/TRAINING PROGRAM
Payer: MEDICARE

## 2021-07-30 DIAGNOSIS — E03.4 HYPOTHYROIDISM DUE TO ACQUIRED ATROPHY OF THYROID: ICD-10-CM

## 2021-07-30 DIAGNOSIS — I10 ESSENTIAL HYPERTENSION: ICD-10-CM

## 2021-07-30 DIAGNOSIS — E78.5 DYSLIPIDEMIA: ICD-10-CM

## 2021-07-30 LAB
ALBUMIN SERPL BCP-MCNC: 4.2 G/DL (ref 3.2–4.9)
ALBUMIN/GLOB SERPL: 1.1 G/DL
ALP SERPL-CCNC: 79 U/L (ref 30–99)
ALT SERPL-CCNC: 30 U/L (ref 2–50)
ANION GAP SERPL CALC-SCNC: 11 MMOL/L (ref 7–16)
AST SERPL-CCNC: 27 U/L (ref 12–45)
BILIRUB SERPL-MCNC: 0.7 MG/DL (ref 0.1–1.5)
BUN SERPL-MCNC: 25 MG/DL (ref 8–22)
CALCIUM SERPL-MCNC: 9 MG/DL (ref 8.5–10.5)
CHLORIDE SERPL-SCNC: 102 MMOL/L (ref 96–112)
CHOLEST SERPL-MCNC: 125 MG/DL (ref 100–199)
CO2 SERPL-SCNC: 22 MMOL/L (ref 20–33)
CREAT SERPL-MCNC: 1.33 MG/DL (ref 0.5–1.4)
ERYTHROCYTE [DISTWIDTH] IN BLOOD BY AUTOMATED COUNT: 51.1 FL (ref 35.9–50)
FASTING STATUS PATIENT QL REPORTED: NORMAL
GLOBULIN SER CALC-MCNC: 3.8 G/DL (ref 1.9–3.5)
GLUCOSE SERPL-MCNC: 111 MG/DL (ref 65–99)
HCT VFR BLD AUTO: 48.8 % (ref 42–52)
HDLC SERPL-MCNC: 31 MG/DL
HGB BLD-MCNC: 15.9 G/DL (ref 14–18)
LDLC SERPL CALC-MCNC: 76 MG/DL
MCH RBC QN AUTO: 31.6 PG (ref 27–33)
MCHC RBC AUTO-ENTMCNC: 32.6 G/DL (ref 33.7–35.3)
MCV RBC AUTO: 97 FL (ref 81.4–97.8)
PLATELET # BLD AUTO: 182 K/UL (ref 164–446)
PMV BLD AUTO: 10.8 FL (ref 9–12.9)
POTASSIUM SERPL-SCNC: 4.7 MMOL/L (ref 3.6–5.5)
PROT SERPL-MCNC: 8 G/DL (ref 6–8.2)
RBC # BLD AUTO: 5.03 M/UL (ref 4.7–6.1)
SODIUM SERPL-SCNC: 135 MMOL/L (ref 135–145)
T4 FREE SERPL-MCNC: 1.29 NG/DL (ref 0.93–1.7)
TRIGL SERPL-MCNC: 92 MG/DL (ref 0–149)
TSH SERPL DL<=0.005 MIU/L-ACNC: 4.31 UIU/ML (ref 0.38–5.33)
WBC # BLD AUTO: 8 K/UL (ref 4.8–10.8)

## 2021-07-30 PROCEDURE — 80061 LIPID PANEL: CPT

## 2021-07-30 PROCEDURE — 84443 ASSAY THYROID STIM HORMONE: CPT

## 2021-07-30 PROCEDURE — 85027 COMPLETE CBC AUTOMATED: CPT

## 2021-07-30 PROCEDURE — 84439 ASSAY OF FREE THYROXINE: CPT

## 2021-07-30 PROCEDURE — 36415 COLL VENOUS BLD VENIPUNCTURE: CPT

## 2021-07-30 PROCEDURE — 80053 COMPREHEN METABOLIC PANEL: CPT

## 2022-02-07 ENCOUNTER — PATIENT MESSAGE (OUTPATIENT)
Dept: HEALTH INFORMATION MANAGEMENT | Facility: OTHER | Age: 77
End: 2022-02-07
Payer: MEDICARE

## 2022-05-19 ENCOUNTER — OFFICE VISIT (OUTPATIENT)
Dept: DERMATOLOGY | Facility: IMAGING CENTER | Age: 77
End: 2022-05-19
Payer: MEDICARE

## 2022-05-19 DIAGNOSIS — L57.0 ACTINIC KERATOSIS: ICD-10-CM

## 2022-05-19 PROCEDURE — 17003 DESTRUCT PREMALG LES 2-14: CPT | Performed by: DERMATOLOGY

## 2022-05-19 PROCEDURE — 17000 DESTRUCT PREMALG LESION: CPT | Performed by: DERMATOLOGY

## 2022-05-19 NOTE — PROGRESS NOTES
CC: Skin lesion     Subjective: Return patient here for AK treated with cryac on scalp at last visit Aug 2019    HPI:  Skin lesion   Location:  Scalp   Time present:  3 years   Painful lesion: No  Itching lesion: No  Enlarging lesion: No  Anything make it better or worse?no     HPI:  Skin lesion   Location:  Right cheek   Time present: 3 months   Painful lesion: No , tender   Itching lesion: No  Enlarging lesion: No  Anything make it better or worse?no       History of skin cancer: No  History of precancers/actinic keratoses: Yes, Details: forehead  History of biopsies:No  History of blistering/severe sunburns:No  Family history of skin cancer:No  Family history of atypical moles:No    ROS: no fevers/chills. No itch.  No cough  Relevant PMH: Raynaud's, hypothyroidism  Social: former smoker; sunprotects    PE: Gen:WDWN male in NAD.  Skin: focal: thin hyperkeratotic papules on erythematous base on scalp X 3, forehead X 1 left cheek X 1 and right cheekX 1.  Declined additional skin exam today    A/P: AKs:  -counseled on diagnosis and treatment, including risks/benefits/alternatives of cyrotherapy  -LN2 25 sec X 2 cycles X 6lesions  -f/u if persists at 1 month/PRN    Encouraged LOS in clinic.  Patient reports he is aware to schedule yearly exam.    I have reviewed medications relevant to my specialty.

## 2022-07-26 ENCOUNTER — TELEPHONE (OUTPATIENT)
Dept: HEALTH INFORMATION MANAGEMENT | Facility: OTHER | Age: 77
End: 2022-07-26

## 2022-10-31 ENCOUNTER — DOCUMENTATION (OUTPATIENT)
Dept: HEALTH INFORMATION MANAGEMENT | Facility: OTHER | Age: 77
End: 2022-10-31
Payer: MEDICARE

## 2022-11-14 SDOH — ECONOMIC STABILITY: HOUSING INSECURITY

## 2022-11-14 SDOH — HEALTH STABILITY: MENTAL HEALTH
STRESS IS WHEN SOMEONE FEELS TENSE, NERVOUS, ANXIOUS, OR CAN'T SLEEP AT NIGHT BECAUSE THEIR MIND IS TROUBLED. HOW STRESSED ARE YOU?: NOT AT ALL

## 2022-11-14 SDOH — ECONOMIC STABILITY: TRANSPORTATION INSECURITY

## 2022-11-14 SDOH — HEALTH STABILITY: PHYSICAL HEALTH: ON AVERAGE, HOW MANY MINUTES DO YOU ENGAGE IN EXERCISE AT THIS LEVEL?: 120 MIN

## 2022-11-14 SDOH — HEALTH STABILITY: PHYSICAL HEALTH: ON AVERAGE, HOW MANY DAYS PER WEEK DO YOU ENGAGE IN MODERATE TO STRENUOUS EXERCISE (LIKE A BRISK WALK)?: 6 DAYS

## 2022-11-14 ASSESSMENT — SOCIAL DETERMINANTS OF HEALTH (SDOH)
IN A TYPICAL WEEK, HOW MANY TIMES DO YOU TALK ON THE PHONE WITH FAMILY, FRIENDS, OR NEIGHBORS?: PATIENT DECLINED
DO YOU BELONG TO ANY CLUBS OR ORGANIZATIONS SUCH AS CHURCH GROUPS UNIONS, FRATERNAL OR ATHLETIC GROUPS, OR SCHOOL GROUPS?: NO
HOW OFTEN DO YOU ATTEND CHURCH OR RELIGIOUS SERVICES?: PATIENT DECLINED
HOW OFTEN DO YOU ATTEND CHURCH OR RELIGIOUS SERVICES?: PATIENT DECLINED
IN A TYPICAL WEEK, HOW MANY TIMES DO YOU TALK ON THE PHONE WITH FAMILY, FRIENDS, OR NEIGHBORS?: PATIENT DECLINED
HOW OFTEN DO YOU ATTENT MEETINGS OF THE CLUB OR ORGANIZATION YOU BELONG TO?: PATIENT DECLINED
ARE YOU MARRIED, WIDOWED, DIVORCED, SEPARATED, NEVER MARRIED, OR LIVING WITH A PARTNER?: NEVER MARRIED
HOW OFTEN DO YOU ATTENT MEETINGS OF THE CLUB OR ORGANIZATION YOU BELONG TO?: PATIENT DECLINED
HOW OFTEN DO YOU GET TOGETHER WITH FRIENDS OR RELATIVES?: MORE THAN THREE TIMES A WEEK
DO YOU BELONG TO ANY CLUBS OR ORGANIZATIONS SUCH AS CHURCH GROUPS UNIONS, FRATERNAL OR ATHLETIC GROUPS, OR SCHOOL GROUPS?: NO
ARE YOU MARRIED, WIDOWED, DIVORCED, SEPARATED, NEVER MARRIED, OR LIVING WITH A PARTNER?: NEVER MARRIED
HOW OFTEN DO YOU GET TOGETHER WITH FRIENDS OR RELATIVES?: MORE THAN THREE TIMES A WEEK

## 2022-11-15 ENCOUNTER — OFFICE VISIT (OUTPATIENT)
Dept: MEDICAL GROUP | Facility: MEDICAL CENTER | Age: 77
End: 2022-11-15
Payer: MEDICARE

## 2022-11-15 VITALS
SYSTOLIC BLOOD PRESSURE: 128 MMHG | OXYGEN SATURATION: 96 % | BODY MASS INDEX: 31.4 KG/M2 | HEART RATE: 81 BPM | RESPIRATION RATE: 16 BRPM | DIASTOLIC BLOOD PRESSURE: 74 MMHG | WEIGHT: 212 LBS | HEIGHT: 69 IN | TEMPERATURE: 97.2 F

## 2022-11-15 DIAGNOSIS — E66.9 OBESITY (BMI 30-39.9): ICD-10-CM

## 2022-11-15 DIAGNOSIS — E03.4 HYPOTHYROIDISM DUE TO ACQUIRED ATROPHY OF THYROID: ICD-10-CM

## 2022-11-15 DIAGNOSIS — I10 ESSENTIAL HYPERTENSION: ICD-10-CM

## 2022-11-15 DIAGNOSIS — Z00.00 ENCOUNTER FOR MEDICARE ANNUAL WELLNESS EXAM: ICD-10-CM

## 2022-11-15 DIAGNOSIS — R73.01 IFG (IMPAIRED FASTING GLUCOSE): ICD-10-CM

## 2022-11-15 DIAGNOSIS — I50.22 CHRONIC SYSTOLIC HEART FAILURE (HCC): ICD-10-CM

## 2022-11-15 DIAGNOSIS — E78.5 DYSLIPIDEMIA: ICD-10-CM

## 2022-11-15 PROCEDURE — G0439 PPPS, SUBSEQ VISIT: HCPCS | Performed by: STUDENT IN AN ORGANIZED HEALTH CARE EDUCATION/TRAINING PROGRAM

## 2022-11-15 RX ORDER — LISINOPRIL 10 MG/1
10 TABLET ORAL DAILY
Qty: 100 TABLET | Refills: 3 | Status: SHIPPED | OUTPATIENT
Start: 2022-11-15

## 2022-11-15 RX ORDER — ATORVASTATIN CALCIUM 80 MG/1
80 TABLET, FILM COATED ORAL EVERY EVENING
Qty: 100 TABLET | Refills: 3 | Status: SHIPPED | OUTPATIENT
Start: 2022-11-15

## 2022-11-15 RX ORDER — LEVOTHYROXINE SODIUM 88 UG/1
88 TABLET ORAL
Qty: 90 TABLET | Refills: 3 | Status: SHIPPED | OUTPATIENT
Start: 2022-11-15

## 2022-11-15 RX ORDER — AMLODIPINE BESYLATE 5 MG/1
5 TABLET ORAL DAILY
Qty: 90 TABLET | Refills: 3 | Status: SHIPPED | OUTPATIENT
Start: 2022-11-15

## 2022-11-15 ASSESSMENT — FIBROSIS 4 INDEX: FIB4 SCORE: 2.09

## 2022-11-15 ASSESSMENT — PATIENT HEALTH QUESTIONNAIRE - PHQ9: CLINICAL INTERPRETATION OF PHQ2 SCORE: 0

## 2022-11-15 ASSESSMENT — ACTIVITIES OF DAILY LIVING (ADL): BATHING_REQUIRES_ASSISTANCE: 0

## 2022-11-15 ASSESSMENT — ENCOUNTER SYMPTOMS: GENERAL WELL-BEING: GOOD

## 2022-11-15 NOTE — PROGRESS NOTES
Chief Complaint   Patient presents with    Annual Exam       HPI:  Luke Davis is a 77 y.o. here for Medicare Annual Wellness Visit     Hypothyroidism due to acquired atrophy of thyroid  Patient continues on levothyroxine 88 mcg.  Patient is due for lab work.   She denies symptoms of hyper/hypothyroid.  Patient denies heat/cold intolerance, constipation/diarrhea, hair or skin changes, palpitations, weight changes.     Essential hypertension  Blood pressure remains controlled on lisinopril and amlodipine.  Patient's blood pressure today     Dyslipidemia  Patient continues on atorvastatin 80 mg.  Patient continues to deny myalgias.  Patient needs updated labs.     H/O heart failure  Patient does not follow with cardiology.  Recent echo from 2020 and 2019 show that ejection fraction remained stable at 55%.  Patient is not following with cardiology.  Patient denies chest pain, shortness of breath.     IFG  Patient with previous IFG.  Patient due for repeat A1c.    Patient Active Problem List    Diagnosis Date Noted    Chronic systolic heart failure (HCC) 11/15/2022    Obesity (BMI 30-39.9) 06/11/2019    Impaired fasting glucose 07/18/2018    H/O heart failure 05/18/2018    Dyslipidemia 05/18/2018    Raynaud's phenomenon without gangrene 05/18/2018    Hypothyroidism due to acquired atrophy of thyroid 05/18/2018    Essential hypertension 05/18/2018    History of transient ischemic attack 03/04/2011       Current Outpatient Medications   Medication Sig Dispense Refill    amLODIPine (NORVASC) 5 MG Tab Take 1 Tablet by mouth every day. 90 Tablet 3    atorvastatin (LIPITOR) 80 MG tablet Take 1 Tablet by mouth every evening. 100 Tablet 3    levothyroxine (SYNTHROID) 88 MCG Tab Take 1 Tablet by mouth every morning on an empty stomach. 90 Tablet 3    lisinopril (PRINIVIL) 10 MG Tab Take 1 Tablet by mouth every day. 100 Tablet 3    aspirin (ASA) 81 MG Chew Tab chewable tablet Chew 1 Tablet every day. 100 Tab      No current  facility-administered medications for this visit.          Current supplements as per medication list.     Allergies: Patient has no known allergies.    Current social contact/activities: restoring old motor home     He  reports that he has quit smoking. His smoking use included cigarettes. He has a 24.50 pack-year smoking history. He has never used smokeless tobacco. He reports that he does not currently use alcohol after a past usage of about 3.0 - 3.6 oz per week. He reports that he does not use drugs.  Counseling given: Yes  Tobacco comments: Started at 19      ROS:    Gait: Uses no assistive device  Ostomy: No  Other tubes: No  Amputations: No  Chronic oxygen use: No  Last eye exam: Year 4 years  Wears hearing aids: No   : Denies any urinary leakage during the last 6 months    Screening:  Depression Screening  Little interest or pleasure in doing things?  0 - not at all  Feeling down, depressed , or hopeless? 0 - not at all  Patient Health Questionnaire Score: 0     If depressive symptoms identified deferred to follow up visit unless specifically addressed in assessment and plan.    Interpretation of PHQ-9 Total Score   Score Severity   1-4 No Depression   5-9 Mild Depression   10-14 Moderate Depression   15-19 Moderately Severe Depression   20-27 Severe Depression    Screening for Cognitive Impairment  Three Minute Recall (daughter, heaven, mountain) 2/3    Javon clock face with all 12 numbers and set the hands to show 10 past 11.  Yes    Cognitive concerns identified deferred for follow up unless specifically addressed in assessment and plan.    Fall Risk Assessment  Has the patient had two or more falls in the last year or any fall with injury in the last year?  No    Safety Assessment  Throw rugs on floor.  Yes  Handrails on all stairs.  Yes  Good lighting in all hallways.  Yes  Difficulty hearing.  Yes  Patient counseled about all safety risks that were identified.    Functional Assessment ADLs  Are there  any barriers preventing you from cooking for yourself or meeting nutritional needs?  No.    Are there any barriers preventing you from driving safely or obtaining transportation?  No.    Are there any barriers preventing you from using a telephone or calling for help?  No.    Are there any barriers preventing you from shopping?  No.    Are there any barriers preventing you from taking care of your own finances?  No.    Are there any barriers preventing you from managing your medications?  No.    Are there any barriers preventing you from showering, bathing or dressing yourself?  No.    Are you currently engaging in any exercise or physical activity?  Yes.     What is your perception of your health?  Good    Advance Care Planning  Do you have an Advance Directive, Living Will, Durable Power of , or POLST? No                 Health Maintenance Summary            Overdue - IMM HEP B VACCINE (1 of 3 - 3-dose series) Overdue - never done      No completion history exists for this topic.              Overdue - COVID-19 Vaccine (3 - Booster for Moderna series) Overdue since 4/30/2021 03/05/2021  Imm Admin: MODERNA SARS-COV-2 VACCINE (12+)    02/06/2021  Imm Admin: MODERNA SARS-COV-2 VACCINE (12+)              Overdue - IMM INFLUENZA (1) Overdue since 9/1/2022 11/02/2020  Imm Admin: Influenza Vaccine Adult HD    10/09/2019  Imm Admin: Influenza, Unspecified - HISTORICAL DATA    10/08/2019  Imm Admin: Influenza Vaccine Adult HD    10/12/2018  Imm Admin: Influenza Vaccine Quad Inj (Pf)    10/27/2017  Imm Admin: Influenza Vaccine Adult HD    Only the first 5 history entries have been loaded, but more history exists.              Annual Wellness Visit (Every 366 Days) Next due on 11/16/2023      11/15/2022  Visit Dx: Encounter for Medicare annual wellness exam    11/15/2022  Level of Service: ANNUAL WELLNESS VISIT-INCLUDES PPPS SUBSEQUE*    06/11/2019  Initial Annual Wellness Visit - Includes PPPS ()     06/11/2019  Visit Dx: Medicare annual wellness visit, subsequent              IMM DTaP/Tdap/Td Vaccine (3 - Td or Tdap) Next due on 7/17/2028 07/17/2018  Imm Admin: Tdap Vaccine    07/21/2009  Imm Admin: Tdap Vaccine              IMM PNEUMOCOCCAL VACCINE: 65+ Years (Series Information) Completed      06/11/2019  Imm Admin: Pneumococcal polysaccharide vaccine (PPSV-23)    03/16/2018  Imm Admin: Pneumococcal Conjugate Vaccine (Prevnar/PCV-13)              HEPATITIS C SCREENING  Completed      09/19/2019  HEP C VIRUS ANTIBODY              IMM ZOSTER VACCINES (Series Information) Completed      01/13/2021  Imm Admin: Zoster Vaccine Recombinant (RZV) (SHINGRIX)    11/02/2020  Imm Admin: Zoster Vaccine Recombinant (RZV) (SHINGRIX)    02/12/2015  Imm Admin: Zoster Vaccine Live (ZVL) (Zostavax) - HISTORICAL DATA              IMM MENINGOCOCCAL ACWY VACCINE (Series Information) Aged Out      No completion history exists for this topic.              Discontinued - COLORECTAL CANCER SCREENING  Discontinued        Frequency changed to Never automatically (Topic No Longer Applies)    05/08/2012  COLONOSCOPY (Done)                    Patient Care Team:  MARY Valdovinos (Inactive) as PCP - H Paneled  Ritu House P.A.-C. (Family Medicine)        Social History     Tobacco Use    Smoking status: Former     Packs/day: 0.50     Years: 49.00     Pack years: 24.50     Types: Cigarettes    Smokeless tobacco: Never    Tobacco comments:     Started at 19   Vaping Use    Vaping Use: Never used   Substance Use Topics    Alcohol use: Not Currently     Alcohol/week: 3.0 - 3.6 oz     Types: 5 - 6 Shots of liquor per week    Drug use: No     Family History   Problem Relation Age of Onset    Alzheimer's Disease Father     No Known Problems Sister     Drug abuse Brother         Herroin/Hepatitis    No Known Problems Maternal Grandmother     No Known Problems Maternal Grandfather     Stroke Paternal Grandmother      He  has no  "past medical history on file.   No past surgical history on file.    Exam:   /74 (BP Location: Right arm, Patient Position: Sitting, BP Cuff Size: Adult)   Pulse 81   Temp 36.2 °C (97.2 °F) (Temporal)   Resp 16   Ht 1.753 m (5' 9\")   Wt 96.2 kg (212 lb)   SpO2 96%  Body mass index is 31.31 kg/m².    Hearing good.    Dentition good  Alert, oriented in no acute distress.  Eye contact is good, speech goal directed, affect calm    Assessment and Plan. The following treatment and monitoring plan is recommended:    1. Encounter for Medicare annual wellness exam    2. Essential hypertension  - amLODIPine (NORVASC) 5 MG Tab; Take 1 Tablet by mouth every day.  Dispense: 90 Tablet; Refill: 3  - lisinopril (PRINIVIL) 10 MG Tab; Take 1 Tablet by mouth every day.  Dispense: 100 Tablet; Refill: 3    3. Dyslipidemia  - atorvastatin (LIPITOR) 80 MG tablet; Take 1 Tablet by mouth every evening.  Dispense: 100 Tablet; Refill: 3  - Lipid Profile; Future  - HEMOGLOBIN A1C; Future  - Comp Metabolic Panel; Future    4. Hypothyroidism due to acquired atrophy of thyroid  - levothyroxine (SYNTHROID) 88 MCG Tab; Take 1 Tablet by mouth every morning on an empty stomach.  Dispense: 90 Tablet; Refill: 3  - TSH; Future  - FREE THYROXINE; Future    5. IFG (impaired fasting glucose)  - HEMOGLOBIN A1C; Future    6. Chronic systolic heart failure (HCC)    7. Obesity (BMI 30-39.9)    Services suggested: No services needed at this time  Health Care Screening: Age-appropriate preventive services recommended by USPTF and ACIP covered by Medicare were discussed today. Services ordered if indicated and agreed upon by the patient.  Referrals offered: Community-based lifestyle interventions to reduce health risks and promote self-management and wellness, fall prevention, nutrition, physical activity, tobacco-use cessation, weight loss, and mental health services as per orders if indicated.    Discussion today about general wellness and lifestyle " habits:    Prevent falls and reduce trip hazards; Cautioned about securing or removing rugs.  Have a working fire alarm and carbon monoxide detector;   Engage in regular physical activity and social activities     Follow-up: Return in about 1 year (around 11/15/2023).

## 2022-12-12 ENCOUNTER — HOSPITAL ENCOUNTER (OUTPATIENT)
Dept: LAB | Facility: MEDICAL CENTER | Age: 77
End: 2022-12-12
Attending: STUDENT IN AN ORGANIZED HEALTH CARE EDUCATION/TRAINING PROGRAM
Payer: MEDICARE

## 2022-12-12 DIAGNOSIS — E78.5 DYSLIPIDEMIA: ICD-10-CM

## 2022-12-12 DIAGNOSIS — R73.01 IFG (IMPAIRED FASTING GLUCOSE): ICD-10-CM

## 2022-12-12 DIAGNOSIS — E03.4 HYPOTHYROIDISM DUE TO ACQUIRED ATROPHY OF THYROID: ICD-10-CM

## 2022-12-12 LAB
ALBUMIN SERPL BCP-MCNC: 4.3 G/DL (ref 3.2–4.9)
ALBUMIN/GLOB SERPL: 1.1 G/DL
ALP SERPL-CCNC: 89 U/L (ref 30–99)
ALT SERPL-CCNC: 33 U/L (ref 2–50)
ANION GAP SERPL CALC-SCNC: 9 MMOL/L (ref 7–16)
AST SERPL-CCNC: 29 U/L (ref 12–45)
BILIRUB SERPL-MCNC: 0.9 MG/DL (ref 0.1–1.5)
BUN SERPL-MCNC: 20 MG/DL (ref 8–22)
CALCIUM SERPL-MCNC: 9.5 MG/DL (ref 8.5–10.5)
CHLORIDE SERPL-SCNC: 100 MMOL/L (ref 96–112)
CHOLEST SERPL-MCNC: 149 MG/DL (ref 100–199)
CO2 SERPL-SCNC: 29 MMOL/L (ref 20–33)
CREAT SERPL-MCNC: 1.05 MG/DL (ref 0.5–1.4)
EST. AVERAGE GLUCOSE BLD GHB EST-MCNC: 126 MG/DL
FASTING STATUS PATIENT QL REPORTED: NORMAL
GFR SERPLBLD CREATININE-BSD FMLA CKD-EPI: 73 ML/MIN/1.73 M 2
GLOBULIN SER CALC-MCNC: 3.9 G/DL (ref 1.9–3.5)
GLUCOSE SERPL-MCNC: 92 MG/DL (ref 65–99)
HBA1C MFR BLD: 6 % (ref 4–5.6)
HDLC SERPL-MCNC: 41 MG/DL
LDLC SERPL CALC-MCNC: 90 MG/DL
POTASSIUM SERPL-SCNC: 4.6 MMOL/L (ref 3.6–5.5)
PROT SERPL-MCNC: 8.2 G/DL (ref 6–8.2)
SODIUM SERPL-SCNC: 138 MMOL/L (ref 135–145)
T4 FREE SERPL-MCNC: 1.34 NG/DL (ref 0.93–1.7)
TRIGL SERPL-MCNC: 88 MG/DL (ref 0–149)
TSH SERPL DL<=0.005 MIU/L-ACNC: 2.36 UIU/ML (ref 0.38–5.33)

## 2022-12-12 PROCEDURE — 84443 ASSAY THYROID STIM HORMONE: CPT

## 2022-12-12 PROCEDURE — 83036 HEMOGLOBIN GLYCOSYLATED A1C: CPT

## 2022-12-12 PROCEDURE — 80053 COMPREHEN METABOLIC PANEL: CPT

## 2022-12-12 PROCEDURE — 36415 COLL VENOUS BLD VENIPUNCTURE: CPT

## 2022-12-12 PROCEDURE — 80061 LIPID PANEL: CPT

## 2022-12-12 PROCEDURE — 84439 ASSAY OF FREE THYROXINE: CPT

## 2023-11-28 ENCOUNTER — APPOINTMENT (RX ONLY)
Dept: URBAN - METROPOLITAN AREA CLINIC 22 | Facility: CLINIC | Age: 78
Setting detail: DERMATOLOGY
End: 2023-11-28

## 2023-11-28 DIAGNOSIS — D22 MELANOCYTIC NEVI: ICD-10-CM

## 2023-11-28 DIAGNOSIS — L82.1 OTHER SEBORRHEIC KERATOSIS: ICD-10-CM

## 2023-11-28 DIAGNOSIS — D18.0 HEMANGIOMA: ICD-10-CM

## 2023-11-28 DIAGNOSIS — Z71.89 OTHER SPECIFIED COUNSELING: ICD-10-CM

## 2023-11-28 DIAGNOSIS — L57.0 ACTINIC KERATOSIS: ICD-10-CM

## 2023-11-28 DIAGNOSIS — L81.4 OTHER MELANIN HYPERPIGMENTATION: ICD-10-CM

## 2023-11-28 PROBLEM — D18.01 HEMANGIOMA OF SKIN AND SUBCUTANEOUS TISSUE: Status: ACTIVE | Noted: 2023-11-28

## 2023-11-28 PROBLEM — D22.5 MELANOCYTIC NEVI OF TRUNK: Status: ACTIVE | Noted: 2023-11-28

## 2023-11-28 PROCEDURE — 17000 DESTRUCT PREMALG LESION: CPT

## 2023-11-28 PROCEDURE — ? LIQUID NITROGEN

## 2023-11-28 PROCEDURE — 99203 OFFICE O/P NEW LOW 30 MIN: CPT | Mod: 25

## 2023-11-28 PROCEDURE — ? ADDITIONAL NOTES

## 2023-11-28 PROCEDURE — 17003 DESTRUCT PREMALG LES 2-14: CPT

## 2023-11-28 PROCEDURE — ? PHOTO-DOCUMENTATION

## 2023-11-28 PROCEDURE — ? COUNSELING

## 2023-11-28 PROCEDURE — ? SUNSCREEN RECOMMENDATIONS

## 2023-11-28 ASSESSMENT — LOCATION SIMPLE DESCRIPTION DERM
LOCATION SIMPLE: NECK
LOCATION SIMPLE: LEFT FOREARM
LOCATION SIMPLE: RIGHT CHEEK
LOCATION SIMPLE: CHEST
LOCATION SIMPLE: RIGHT LOWER BACK
LOCATION SIMPLE: RIGHT EAR
LOCATION SIMPLE: LEFT UPPER BACK
LOCATION SIMPLE: LEFT SCALP
LOCATION SIMPLE: NOSE
LOCATION SIMPLE: SCALP

## 2023-11-28 ASSESSMENT — LOCATION ZONE DERM
LOCATION ZONE: NOSE
LOCATION ZONE: EAR
LOCATION ZONE: TRUNK
LOCATION ZONE: ARM
LOCATION ZONE: FACE
LOCATION ZONE: SCALP
LOCATION ZONE: NECK

## 2023-11-28 ASSESSMENT — LOCATION DETAILED DESCRIPTION DERM
LOCATION DETAILED: RIGHT MEDIAL INFERIOR CHEST
LOCATION DETAILED: LEFT VENTRAL DISTAL FOREARM
LOCATION DETAILED: RIGHT SUPERIOR LATERAL MIDBACK
LOCATION DETAILED: RIGHT SUPERIOR HELIX
LOCATION DETAILED: LEFT INFERIOR UPPER BACK
LOCATION DETAILED: NASAL SUPRATIP
LOCATION DETAILED: LEFT MEDIAL FRONTAL SCALP
LOCATION DETAILED: RIGHT CENTRAL MALAR CHEEK
LOCATION DETAILED: RIGHT SUPERIOR PARIETAL SCALP
LOCATION DETAILED: LEFT PROXIMAL DORSAL FOREARM
LOCATION DETAILED: RIGHT CENTRAL LATERAL NECK

## 2023-11-28 NOTE — PROCEDURE: LIQUID NITROGEN
Duration Of Freeze Thaw-Cycle (Seconds): 3
Consent: The patient's consent was obtained including but not limited to risks of crusting, scabbing, blistering, scarring, darker or lighter pigmentary change, recurrence, incomplete removal and infection.
Show Aperture Variable?: Yes
Detail Level: Detailed
Render Note In Bullet Format When Appropriate: No
Post-Care Instructions: I reviewed with the patient in detail post-care instructions. Patient is to wear sunprotection, and avoid picking at any of the treated lesions. Pt may apply Vaseline to crusted or scabbing areas.
Number Of Freeze-Thaw Cycles: 2 freeze-thaw cycles

## 2023-11-28 NOTE — PROCEDURE: ADDITIONAL NOTES
Render Risk Assessment In Note?: no
Detail Level: Simple
Additional Notes: Pt gave verbal consent.\\n2 month follow up for skin check, recheck, bx if persistent.
Additional Notes: Pt gave verbal consent.

## 2024-02-06 ENCOUNTER — APPOINTMENT (RX ONLY)
Dept: URBAN - METROPOLITAN AREA CLINIC 22 | Facility: CLINIC | Age: 79
Setting detail: DERMATOLOGY
End: 2024-02-06

## 2024-02-06 DIAGNOSIS — L81.4 OTHER MELANIN HYPERPIGMENTATION: ICD-10-CM

## 2024-02-06 DIAGNOSIS — L57.0 ACTINIC KERATOSIS: ICD-10-CM | Status: RESOLVED

## 2024-02-06 PROBLEM — D48.5 NEOPLASM OF UNCERTAIN BEHAVIOR OF SKIN: Status: ACTIVE | Noted: 2024-02-06

## 2024-02-06 PROCEDURE — ? ADDITIONAL NOTES

## 2024-02-06 PROCEDURE — ? BIOPSY BY SHAVE METHOD

## 2024-02-06 PROCEDURE — ? LIQUID NITROGEN

## 2024-02-06 PROCEDURE — 17000 DESTRUCT PREMALG LESION: CPT | Mod: 59

## 2024-02-06 PROCEDURE — 11102 TANGNTL BX SKIN SINGLE LES: CPT

## 2024-02-06 PROCEDURE — 17003 DESTRUCT PREMALG LES 2-14: CPT

## 2024-02-06 PROCEDURE — ? COUNSELING

## 2024-02-06 PROCEDURE — 99213 OFFICE O/P EST LOW 20 MIN: CPT | Mod: 25

## 2024-02-06 ASSESSMENT — LOCATION SIMPLE DESCRIPTION DERM
LOCATION SIMPLE: NECK
LOCATION SIMPLE: RIGHT FOREHEAD
LOCATION SIMPLE: LEFT FOREHEAD
LOCATION SIMPLE: LEFT NOSE
LOCATION SIMPLE: LEFT CHEEK

## 2024-02-06 ASSESSMENT — LOCATION DETAILED DESCRIPTION DERM
LOCATION DETAILED: LEFT MEDIAL MALAR CHEEK
LOCATION DETAILED: RIGHT CENTRAL LATERAL NECK
LOCATION DETAILED: LEFT SUPERIOR FOREHEAD
LOCATION DETAILED: RIGHT SUPERIOR MEDIAL FOREHEAD
LOCATION DETAILED: LEFT NASAL SIDEWALL
LOCATION DETAILED: LEFT SUPERIOR MEDIAL MALAR CHEEK
LOCATION DETAILED: LEFT CENTRAL MALAR CHEEK
LOCATION DETAILED: LEFT SUPERIOR MEDIAL FOREHEAD

## 2024-02-06 ASSESSMENT — LOCATION ZONE DERM
LOCATION ZONE: NOSE
LOCATION ZONE: FACE
LOCATION ZONE: NECK

## 2024-02-06 NOTE — PROCEDURE: BIOPSY BY SHAVE METHOD
Detail Level: Detailed
Depth Of Biopsy: dermis
Was A Bandage Applied: Yes
Size Of Lesion In Cm: 0.5
X Size Of Lesion In Cm: 0.2
Biopsy Type: H and E
Biopsy Method: Dermablade
Anesthesia Type: 0.5% lidocaine without epinephrine
Additional Anesthesia Volume In Cc (Will Not Render If 0): 0
Hemostasis: Drysol
Wound Care: Petrolatum
Dressing: bandage
Destruction After The Procedure: No
Type Of Destruction Used: Curettage
Curettage Text: The wound bed was treated with curettage after the biopsy was performed.
Cryotherapy Text: The wound bed was treated with cryotherapy after the biopsy was performed.
Electrodesiccation Text: The wound bed was treated with electrodesiccation after the biopsy was performed.
Electrodesiccation And Curettage Text: The wound bed was treated with electrodesiccation and curettage after the biopsy was performed.
Silver Nitrate Text: The wound bed was treated with silver nitrate after the biopsy was performed.
Lab: [id number]
Lab Facility: 
Consent: Written consent was obtained and risks were reviewed including but not limited to scarring, infection, bleeding, scabbing, incomplete removal, nerve damage and allergy to anesthesia.
Post-Care Instructions: I reviewed with the patient in detail post-care instructions. Patient is to keep the biopsy site dry overnight, and then apply bacitracin twice daily until healed. Patient may apply hydrogen peroxide soaks to remove any crusting.
Notification Instructions: Patient will be notified of biopsy results. However, patient instructed to call the office if not contacted within 2 weeks.
Billing Type: Third-Party Bill
Information: Selecting Yes will display possible errors in your note based on the variables you have selected. This validation is only offered as a suggestion for you. PLEASE NOTE THAT THE VALIDATION TEXT WILL BE REMOVED WHEN YOU FINALIZE YOUR NOTE. IF YOU WANT TO FAX A PRELIMINARY NOTE YOU WILL NEED TO TOGGLE THIS TO 'NO' IF YOU DO NOT WANT IT IN YOUR FAXED NOTE.

## 2024-02-06 NOTE — PROCEDURE: ADDITIONAL NOTES
Additional Notes: Site was rechecked against previous photos and is stable.  Continue to monitor.
Detail Level: Simple
Render Risk Assessment In Note?: no

## 2024-02-06 NOTE — PROCEDURE: LIQUID NITROGEN
Post-Care Instructions: I reviewed with the patient in detail post-care instructions. Patient is to wear sunprotection, and avoid picking at any of the treated lesions. Pt may apply Vaseline to crusted or scabbing areas.
Number Of Freeze-Thaw Cycles: 2 freeze-thaw cycles
Render Post-Care Instructions In Note?: no
Show Aperture Variable?: Yes
Consent: The patient's consent was obtained including but not limited to risks of crusting, scabbing, blistering, scarring, darker or lighter pigmentary change, recurrence, incomplete removal and infection.
Duration Of Freeze Thaw-Cycle (Seconds): 3
Detail Level: Detailed

## 2024-04-03 ENCOUNTER — DOCUMENTATION (OUTPATIENT)
Dept: HEALTH INFORMATION MANAGEMENT | Facility: OTHER | Age: 79
End: 2024-04-03

## 2024-05-09 ENCOUNTER — APPOINTMENT (RX ONLY)
Dept: URBAN - METROPOLITAN AREA CLINIC 22 | Facility: CLINIC | Age: 79
Setting detail: DERMATOLOGY
End: 2024-05-09

## 2024-05-09 DIAGNOSIS — Z71.89 OTHER SPECIFIED COUNSELING: ICD-10-CM

## 2024-05-09 DIAGNOSIS — L81.4 OTHER MELANIN HYPERPIGMENTATION: ICD-10-CM

## 2024-05-09 DIAGNOSIS — L57.0 ACTINIC KERATOSIS: ICD-10-CM

## 2024-05-09 DIAGNOSIS — D18.0 HEMANGIOMA: ICD-10-CM

## 2024-05-09 DIAGNOSIS — D22 MELANOCYTIC NEVI: ICD-10-CM

## 2024-05-09 DIAGNOSIS — L82.1 OTHER SEBORRHEIC KERATOSIS: ICD-10-CM

## 2024-05-09 PROBLEM — D48.5 NEOPLASM OF UNCERTAIN BEHAVIOR OF SKIN: Status: ACTIVE | Noted: 2024-05-09

## 2024-05-09 PROBLEM — D22.5 MELANOCYTIC NEVI OF TRUNK: Status: ACTIVE | Noted: 2024-05-09

## 2024-05-09 PROBLEM — D18.01 HEMANGIOMA OF SKIN AND SUBCUTANEOUS TISSUE: Status: ACTIVE | Noted: 2024-05-09

## 2024-05-09 PROCEDURE — ? SUNSCREEN RECOMMENDATIONS

## 2024-05-09 PROCEDURE — ? LIQUID NITROGEN

## 2024-05-09 PROCEDURE — 11102 TANGNTL BX SKIN SINGLE LES: CPT

## 2024-05-09 PROCEDURE — ? BIOPSY BY SHAVE METHOD

## 2024-05-09 PROCEDURE — ? ADDITIONAL NOTES

## 2024-05-09 PROCEDURE — 17000 DESTRUCT PREMALG LESION: CPT | Mod: 59

## 2024-05-09 PROCEDURE — 17003 DESTRUCT PREMALG LES 2-14: CPT

## 2024-05-09 PROCEDURE — 99213 OFFICE O/P EST LOW 20 MIN: CPT | Mod: 25

## 2024-05-09 PROCEDURE — ? COUNSELING

## 2024-05-09 ASSESSMENT — LOCATION ZONE DERM
LOCATION ZONE: SCALP
LOCATION ZONE: FACE
LOCATION ZONE: EAR
LOCATION ZONE: TRUNK
LOCATION ZONE: NOSE
LOCATION ZONE: ARM

## 2024-05-09 ASSESSMENT — LOCATION DETAILED DESCRIPTION DERM
LOCATION DETAILED: NASAL SUPRATIP
LOCATION DETAILED: LEFT PROXIMAL DORSAL FOREARM
LOCATION DETAILED: RIGHT MEDIAL INFERIOR CHEST
LOCATION DETAILED: RIGHT SUPERIOR LATERAL MIDBACK
LOCATION DETAILED: LEFT CENTRAL FRONTAL SCALP
LOCATION DETAILED: LEFT SUPERIOR MEDIAL MALAR CHEEK
LOCATION DETAILED: LEFT INFERIOR UPPER BACK
LOCATION DETAILED: RIGHT SUPERIOR HELIX
LOCATION DETAILED: LEFT MEDIAL FRONTAL SCALP
LOCATION DETAILED: NASAL DORSUM
LOCATION DETAILED: LEFT NASAL ALA

## 2024-05-09 ASSESSMENT — LOCATION SIMPLE DESCRIPTION DERM
LOCATION SIMPLE: LEFT UPPER BACK
LOCATION SIMPLE: LEFT SCALP
LOCATION SIMPLE: LEFT NOSE
LOCATION SIMPLE: RIGHT EAR
LOCATION SIMPLE: LEFT FOREARM
LOCATION SIMPLE: RIGHT LOWER BACK
LOCATION SIMPLE: LEFT CHEEK
LOCATION SIMPLE: NOSE
LOCATION SIMPLE: CHEST

## 2024-05-09 NOTE — PROCEDURE: LIQUID NITROGEN
Show Aperture Variable?: Yes
Duration Of Freeze Thaw-Cycle (Seconds): 3
Post-Care Instructions: I reviewed with the patient in detail post-care instructions. Patient is to wear sunprotection, and avoid picking at any of the treated lesions. Pt may apply Vaseline to crusted or scabbing areas.
Render Note In Bullet Format When Appropriate: No
Consent: The patient's consent was obtained including but not limited to risks of crusting, scabbing, blistering, scarring, darker or lighter pigmentary change, recurrence, incomplete removal and infection.
Number Of Freeze-Thaw Cycles: 2 freeze-thaw cycles
Detail Level: Detailed

## 2024-05-09 NOTE — PROCEDURE: ADDITIONAL NOTES
Render Risk Assessment In Note?: no
Detail Level: Simple
Additional Notes: Field treatment discussion in the Fall.

## 2024-06-28 ENCOUNTER — APPOINTMENT (RX ONLY)
Dept: URBAN - METROPOLITAN AREA CLINIC 22 | Facility: CLINIC | Age: 79
Setting detail: DERMATOLOGY
End: 2024-06-28

## 2024-06-28 DIAGNOSIS — L57.0 ACTINIC KERATOSIS: ICD-10-CM

## 2024-06-28 PROCEDURE — 99213 OFFICE O/P EST LOW 20 MIN: CPT | Mod: 25

## 2024-06-28 PROCEDURE — ? DIAGNOSIS COMMENT

## 2024-06-28 PROCEDURE — ? LIQUID NITROGEN

## 2024-06-28 PROCEDURE — ? ADDITIONAL NOTES

## 2024-06-28 PROCEDURE — 17003 DESTRUCT PREMALG LES 2-14: CPT

## 2024-06-28 PROCEDURE — 17000 DESTRUCT PREMALG LESION: CPT

## 2024-06-28 PROCEDURE — ? COUNSELING

## 2024-06-28 ASSESSMENT — LOCATION SIMPLE DESCRIPTION DERM
LOCATION SIMPLE: LEFT EAR
LOCATION SIMPLE: RIGHT CHEEK
LOCATION SIMPLE: LEFT CHEEK
LOCATION SIMPLE: NOSE
LOCATION SIMPLE: RIGHT EAR

## 2024-06-28 ASSESSMENT — LOCATION DETAILED DESCRIPTION DERM
LOCATION DETAILED: RIGHT SUPERIOR HELIX
LOCATION DETAILED: NASAL SUPRATIP
LOCATION DETAILED: LEFT SUPERIOR MEDIAL MALAR CHEEK
LOCATION DETAILED: LEFT SUPERIOR HELIX
LOCATION DETAILED: RIGHT CENTRAL MALAR CHEEK
LOCATION DETAILED: NASAL DORSUM

## 2024-06-28 ASSESSMENT — LOCATION ZONE DERM
LOCATION ZONE: EAR
LOCATION ZONE: NOSE
LOCATION ZONE: FACE

## 2024-06-28 NOTE — PROCEDURE: ADDITIONAL NOTES
Additional Notes: NER today.
Render Risk Assessment In Note?: no
Detail Level: Simple
Additional Notes: Pt gave verbal consent for LN2 today.

## 2024-06-28 NOTE — PROCEDURE: LIQUID NITROGEN
Show Applicator Variable?: Yes
Render Post-Care Instructions In Note?: no
Number Of Freeze-Thaw Cycles: 2 freeze-thaw cycles
Detail Level: Detailed
Consent: The patient's consent was obtained including but not limited to risks of crusting, scabbing, blistering, scarring, darker or lighter pigmentary change, recurrence, incomplete removal and infection.
Duration Of Freeze Thaw-Cycle (Seconds): 3
Post-Care Instructions: I reviewed with the patient in detail post-care instructions. Patient is to wear sunprotection, and avoid picking at any of the treated lesions. Pt may apply Vaseline to crusted or scabbing areas.

## 2024-09-13 NOTE — PROCEDURE: MIPS QUALITY
Detail Level: Detailed
Quality 431: Preventive Care And Screening: Unhealthy Alcohol Use - Screening: Patient not identified as an unhealthy alcohol user when screened for unhealthy alcohol use using a systematic screening method
100
Quality 226: Preventive Care And Screening: Tobacco Use: Screening And Cessation Intervention: Patient screened for tobacco use and is an ex/non-smoker

## 2024-11-19 ENCOUNTER — APPOINTMENT (RX ONLY)
Dept: URBAN - METROPOLITAN AREA CLINIC 22 | Facility: CLINIC | Age: 79
Setting detail: DERMATOLOGY
End: 2024-11-19

## 2024-11-19 DIAGNOSIS — L57.8 OTHER SKIN CHANGES DUE TO CHRONIC EXPOSURE TO NONIONIZING RADIATION: ICD-10-CM

## 2024-11-19 DIAGNOSIS — Z71.89 OTHER SPECIFIED COUNSELING: ICD-10-CM

## 2024-11-19 DIAGNOSIS — L57.0 ACTINIC KERATOSIS: ICD-10-CM

## 2024-11-19 PROCEDURE — 17003 DESTRUCT PREMALG LES 2-14: CPT

## 2024-11-19 PROCEDURE — 17000 DESTRUCT PREMALG LESION: CPT

## 2024-11-19 PROCEDURE — ? LIQUID NITROGEN

## 2024-11-19 PROCEDURE — ? ADDITIONAL NOTES

## 2024-11-19 PROCEDURE — ? PRESCRIPTION

## 2024-11-19 PROCEDURE — 99213 OFFICE O/P EST LOW 20 MIN: CPT | Mod: 25

## 2024-11-19 PROCEDURE — ? COUNSELING

## 2024-11-19 RX ORDER — FLUOROURACIL 5 MG/G
CREAM TOPICAL BID
Qty: 40 | Refills: 1 | Status: ERX | COMMUNITY
Start: 2024-11-19

## 2024-11-19 RX ORDER — CALCIPOTRIENE 50 UG/G
CREAM TOPICAL BID
Qty: 60 | Refills: 1 | Status: ERX | COMMUNITY
Start: 2024-11-19

## 2024-11-19 RX ADMIN — FLUOROURACIL: 5 CREAM TOPICAL at 00:00

## 2024-11-19 RX ADMIN — CALCIPOTRIENE: 50 CREAM TOPICAL at 00:00

## 2024-11-19 ASSESSMENT — LOCATION SIMPLE DESCRIPTION DERM
LOCATION SIMPLE: NOSE
LOCATION SIMPLE: LEFT NOSE
LOCATION SIMPLE: LEFT SCALP
LOCATION SIMPLE: POSTERIOR SCALP
LOCATION SIMPLE: SCALP
LOCATION SIMPLE: RIGHT EYEBROW
LOCATION SIMPLE: RIGHT FOREHEAD
LOCATION SIMPLE: RIGHT EAR
LOCATION SIMPLE: SUPERIOR FOREHEAD
LOCATION SIMPLE: LEFT FOREHEAD

## 2024-11-19 ASSESSMENT — LOCATION DETAILED DESCRIPTION DERM
LOCATION DETAILED: LEFT SUPERIOR FOREHEAD
LOCATION DETAILED: SUPERIOR MID FOREHEAD
LOCATION DETAILED: LEFT NASAL SIDEWALL
LOCATION DETAILED: RIGHT FOREHEAD
LOCATION DETAILED: POSTERIOR MID-PARIETAL SCALP
LOCATION DETAILED: RIGHT CENTRAL EYEBROW
LOCATION DETAILED: LEFT SUPERIOR PARIETAL SCALP
LOCATION DETAILED: RIGHT SUPERIOR PARIETAL SCALP
LOCATION DETAILED: LEFT MEDIAL FRONTAL SCALP
LOCATION DETAILED: RIGHT SUPERIOR HELIX
LOCATION DETAILED: RIGHT SUPERIOR FOREHEAD
LOCATION DETAILED: NASAL DORSUM
LOCATION DETAILED: NASAL SUPRATIP
LOCATION DETAILED: LEFT SUPERIOR MEDIAL FOREHEAD

## 2024-11-19 ASSESSMENT — LOCATION ZONE DERM
LOCATION ZONE: FACE
LOCATION ZONE: NOSE
LOCATION ZONE: SCALP
LOCATION ZONE: EAR

## 2024-11-19 NOTE — PROCEDURE: ADDITIONAL NOTES
Detail Level: Simple
Render Risk Assessment In Note?: no
Additional Notes: Discussed field therapy which patient is interested in.   Recommend treating forehead/scalp and nose as highest priority areas. \\nMay follow up

## 2025-08-07 ENCOUNTER — APPOINTMENT (OUTPATIENT)
Dept: URBAN - METROPOLITAN AREA CLINIC 22 | Facility: CLINIC | Age: 80
Setting detail: DERMATOLOGY
End: 2025-08-07

## 2025-08-07 DIAGNOSIS — D18.0 HEMANGIOMA: ICD-10-CM

## 2025-08-07 DIAGNOSIS — L82.1 OTHER SEBORRHEIC KERATOSIS: ICD-10-CM

## 2025-08-07 DIAGNOSIS — L57.0 ACTINIC KERATOSIS: ICD-10-CM

## 2025-08-07 DIAGNOSIS — L81.4 OTHER MELANIN HYPERPIGMENTATION: ICD-10-CM

## 2025-08-07 DIAGNOSIS — Z71.89 OTHER SPECIFIED COUNSELING: ICD-10-CM

## 2025-08-07 DIAGNOSIS — L30.0 NUMMULAR DERMATITIS: ICD-10-CM

## 2025-08-07 DIAGNOSIS — D22 MELANOCYTIC NEVI: ICD-10-CM

## 2025-08-07 PROBLEM — D18.01 HEMANGIOMA OF SKIN AND SUBCUTANEOUS TISSUE: Status: ACTIVE | Noted: 2025-08-07

## 2025-08-07 PROBLEM — D22.5 MELANOCYTIC NEVI OF TRUNK: Status: ACTIVE | Noted: 2025-08-07

## 2025-08-07 PROCEDURE — ? ADDITIONAL NOTES

## 2025-08-07 PROCEDURE — ? COUNSELING

## 2025-08-07 PROCEDURE — ? LIQUID NITROGEN

## 2025-08-07 PROCEDURE — ? SUNSCREEN RECOMMENDATIONS

## 2025-08-07 ASSESSMENT — LOCATION ZONE DERM
LOCATION ZONE: FACE
LOCATION ZONE: LEG
LOCATION ZONE: NOSE
LOCATION ZONE: ARM
LOCATION ZONE: TRUNK
LOCATION ZONE: SCALP

## 2025-08-07 ASSESSMENT — LOCATION DETAILED DESCRIPTION DERM
LOCATION DETAILED: LEFT PROXIMAL DORSAL FOREARM
LOCATION DETAILED: POSTERIOR MID-PARIETAL SCALP
LOCATION DETAILED: LEFT CENTRAL MALAR CHEEK
LOCATION DETAILED: NASAL DORSUM
LOCATION DETAILED: LEFT NASAL ALA
LOCATION DETAILED: LEFT SUPERIOR PARIETAL SCALP
LOCATION DETAILED: RIGHT SUPERIOR LATERAL MIDBACK
LOCATION DETAILED: LEFT INFERIOR UPPER BACK
LOCATION DETAILED: LEFT DISTAL DORSAL FOREARM
LOCATION DETAILED: RIGHT MEDIAL INFERIOR CHEST
LOCATION DETAILED: RIGHT CENTRAL MALAR CHEEK
LOCATION DETAILED: SUPERIOR MID FOREHEAD
LOCATION DETAILED: MID-FRONTAL SCALP
LOCATION DETAILED: LEFT DISTAL CALF

## 2025-08-07 ASSESSMENT — LOCATION SIMPLE DESCRIPTION DERM
LOCATION SIMPLE: LEFT NOSE
LOCATION SIMPLE: NOSE
LOCATION SIMPLE: LEFT FOREARM
LOCATION SIMPLE: CHEST
LOCATION SIMPLE: LEFT UPPER BACK
LOCATION SIMPLE: RIGHT CHEEK
LOCATION SIMPLE: SCALP
LOCATION SIMPLE: LEFT CALF
LOCATION SIMPLE: RIGHT LOWER BACK
LOCATION SIMPLE: POSTERIOR SCALP
LOCATION SIMPLE: ANTERIOR SCALP
LOCATION SIMPLE: SUPERIOR FOREHEAD
LOCATION SIMPLE: LEFT CHEEK